# Patient Record
Sex: MALE | Race: WHITE | NOT HISPANIC OR LATINO | Employment: OTHER | URBAN - METROPOLITAN AREA
[De-identification: names, ages, dates, MRNs, and addresses within clinical notes are randomized per-mention and may not be internally consistent; named-entity substitution may affect disease eponyms.]

---

## 2020-02-18 ENCOUNTER — OFFICE VISIT CONVERTED (OUTPATIENT)
Dept: SURGERY | Facility: CLINIC | Age: 75
End: 2020-02-18
Attending: SURGERY

## 2020-10-27 ENCOUNTER — OFFICE VISIT CONVERTED (OUTPATIENT)
Dept: SURGERY | Facility: CLINIC | Age: 75
End: 2020-10-27
Attending: SURGERY

## 2020-12-14 ENCOUNTER — HOSPITAL ENCOUNTER (OUTPATIENT)
Dept: PREADMISSION TESTING | Facility: HOSPITAL | Age: 75
Discharge: HOME | End: 2020-12-14
Attending: SURGERY

## 2020-12-15 LAB — SARS-COV-2 RNA SPEC QL NAA+PROBE: NOT DETECTED

## 2020-12-18 ENCOUNTER — HOSPITAL ENCOUNTER (OUTPATIENT)
Dept: GASTROENTEROLOGY | Facility: HOSPITAL | Age: 75
Setting detail: HOSPITAL OUTPATIENT SURGERY
Discharge: HOME | End: 2020-12-18
Attending: SURGERY

## 2021-01-04 ENCOUNTER — OFFICE VISIT CONVERTED (OUTPATIENT)
Dept: SURGERY | Facility: CLINIC | Age: 76
End: 2021-01-04
Attending: SURGERY

## 2021-01-04 ENCOUNTER — CONVERSION ENCOUNTER (OUTPATIENT)
Dept: SURGERY | Facility: CLINIC | Age: 76
End: 2021-01-04

## 2021-05-14 VITALS — HEIGHT: 69 IN | BODY MASS INDEX: 25.92 KG/M2 | RESPIRATION RATE: 14 BRPM | WEIGHT: 175 LBS

## 2021-05-14 VITALS — BODY MASS INDEX: 25.98 KG/M2 | WEIGHT: 175.37 LBS | HEIGHT: 69 IN

## 2021-05-15 VITALS — RESPIRATION RATE: 16 BRPM | HEIGHT: 69 IN | WEIGHT: 182 LBS | BODY MASS INDEX: 26.96 KG/M2

## 2022-10-03 RX ORDER — CHLORAL HYDRATE 500 MG
CAPSULE ORAL
COMMUNITY

## 2022-10-03 RX ORDER — PRIMIDONE 50 MG/1
50 TABLET ORAL 3 TIMES DAILY
COMMUNITY

## 2022-10-03 RX ORDER — FLUOXETINE HYDROCHLORIDE 40 MG/1
40 CAPSULE ORAL DAILY
COMMUNITY
Start: 2022-07-14

## 2022-10-03 RX ORDER — FERROUS SULFATE 325(65) MG
TABLET ORAL
COMMUNITY

## 2022-10-03 RX ORDER — AMLODIPINE BESYLATE 2.5 MG/1
1 TABLET ORAL DAILY
COMMUNITY
Start: 2022-06-27

## 2022-10-03 RX ORDER — PAROXETINE HYDROCHLORIDE 20 MG/1
20 TABLET, FILM COATED ORAL DAILY
COMMUNITY

## 2022-10-03 RX ORDER — AMLODIPINE BESYLATE 5 MG/1
TABLET ORAL
COMMUNITY

## 2022-10-03 RX ORDER — ALPRAZOLAM 0.25 MG/1
1 TABLET ORAL
COMMUNITY
Start: 2022-05-19

## 2022-10-03 RX ORDER — PANTOPRAZOLE SODIUM 20 MG/1
TABLET, DELAYED RELEASE ORAL
COMMUNITY

## 2022-10-03 RX ORDER — DICLOFENAC SODIUM 75 MG/1
TABLET, DELAYED RELEASE ORAL
COMMUNITY

## 2022-10-03 RX ORDER — ASPIRIN 81 MG/1
TABLET ORAL
COMMUNITY

## 2022-10-03 RX ORDER — AMLODIPINE BESYLATE 2.5 MG/1
TABLET ORAL
COMMUNITY
Start: 2022-10-01

## 2022-10-03 RX ORDER — FLUTICASONE PROPIONATE 50 MCG
SPRAY, SUSPENSION (ML) NASAL
COMMUNITY

## 2022-10-03 RX ORDER — PRAVASTATIN SODIUM 40 MG
TABLET ORAL
COMMUNITY

## 2022-10-03 RX ORDER — SERTRALINE HYDROCHLORIDE 100 MG/1
TABLET, FILM COATED ORAL
COMMUNITY

## 2022-10-03 RX ORDER — MELATONIN
5000 DAILY
COMMUNITY

## 2022-10-03 RX ORDER — LISINOPRIL AND HYDROCHLOROTHIAZIDE 20; 12.5 MG/1; MG/1
1 TABLET ORAL 2 TIMES DAILY
COMMUNITY
Start: 2022-09-26

## 2022-10-03 RX ORDER — AZELASTINE HCL 205.5 UG/1
SPRAY NASAL
COMMUNITY

## 2022-10-03 RX ORDER — AMOXICILLIN 250 MG
CAPSULE ORAL DAILY
COMMUNITY

## 2022-10-03 RX ORDER — CETIRIZINE HYDROCHLORIDE 10 MG/1
10 TABLET ORAL DAILY
COMMUNITY

## 2022-10-03 RX ORDER — ESOMEPRAZOLE MAGNESIUM 40 MG/1
40 FOR SUSPENSION ORAL DAILY
COMMUNITY

## 2022-10-07 ENCOUNTER — OFFICE VISIT (OUTPATIENT)
Dept: SURGERY | Facility: CLINIC | Age: 77
End: 2022-10-07

## 2022-10-07 VITALS — WEIGHT: 174 LBS | RESPIRATION RATE: 16 BRPM | BODY MASS INDEX: 25.7 KG/M2 | OXYGEN SATURATION: 99 %

## 2022-10-07 DIAGNOSIS — R10.32 LEFT GROIN PAIN: Primary | ICD-10-CM

## 2022-10-07 PROCEDURE — 99212 OFFICE O/P EST SF 10 MIN: CPT | Performed by: SURGERY

## 2025-05-12 NOTE — PROGRESS NOTES
Chief Complaint/Reason for Referral:  Anemia    Lore, Heidy Laurent, A*  Heidy Torrez Anastasiia, APRN    Records Obtained:  Records of the patients history including those obtained from [facility] EMR were reviewed and summarized in detail.    Subjective    History of Present Illness    Josef Avila 79 y.o. presents to [facility] for Macrocytic Anemia    History of Present Illness  The patient is a 79-year-old male who presents to the hematology oncology department as a new patient for further evaluation of his anemia.    He was unaware of his anemic condition until recently. He reports no symptoms such as shortness of breath, palpitations, dizziness, or lightheadedness. He has not experienced any hematuria, epistaxis, or hemoptysis. He also reports no night sweats. He has not required a blood transfusion in the past and does not recall being advised to take iron supplements. He has been under the care of Dr. Moody for approximately one year and does not consult with any other specialists.    Blood work performed on 04/30/2025 revealed a hemoglobin level of 11.8, which is below the normal range for males. Additional blood work from 11/2024 showed a hemoglobin level of 12.2. He has observed minor blood spotting during defecation for the past few months, but it is not present in the stool itself. The blood is described as bright red. He has not sought medical attention for this issue. He reports no abdominal pain, changes in bowel habits, nausea, vomiting, diarrhea, or constipation. His appetite remains good, and he has not experienced any weight loss. He occasionally experiences fatigue.    SOCIAL HISTORY  He does not use tobacco or alcohol. He is  and lives with his wife.    Patient is originally from [place].     Cancer-related family history is not on file.     Oncology/Hematology History    No history exists.     Review of Systems    Current Outpatient Medications on  File Prior to Visit   Medication Sig Dispense Refill    ALPRAZolam (XANAX) 0.25 MG tablet Take 1 tablet by mouth every night at bedtime.      amLODIPine (NORVASC) 2.5 MG tablet       aspirin 81 MG EC tablet aspirin 81 mg oral tablet,delayed release (DR/EC) take 1 tablet (81 mg) by oral route once daily   Active      azelastine (ASTEPRO) 0.15 % solution nasal spray azelastine 0.15 % (205.5 mcg) nasal spray,non-aerosol spray 1 spray (205.5 mcg) in each nostril by intranasal route 2 times per day   Suspended      cetirizine (zyrTEC) 10 MG tablet Take 1 tablet by mouth Daily.      cholecalciferol (VITAMIN D3) 25 MCG (1000 UT) tablet Take 5 tablets by mouth Daily.      Cobalamin Combinations (B-12) 100-5000 MCG sublingual tablet Place  under the tongue Daily.      diclofenac (VOLTAREN) 75 MG EC tablet diclofenac sodium 75 mg oral tablet,delayed release (DR/EC) take 1 tablet (75 mg) by oral route 2 times per day   Active      esomeprazole (nexIUM) 20 MG capsule       esomeprazole (NexIUM) 40 MG packet Take 40 mg by mouth Daily.      ferrous sulfate 325 (65 FE) MG tablet       fluticasone (FLONASE) 50 MCG/ACT nasal spray fluticasone 50 mcg/actuation nasal spray,suspension spray 1 spray (50 mcg) in each nostril by intranasal route once daily   Active      lisinopril-hydrochlorothiazide (PRINZIDE,ZESTORETIC) 20-12.5 MG per tablet Take 1 tablet by mouth 2 (Two) Times a Day.      Omega-3 Fatty Acids (fish oil) 1000 MG capsule capsule Take  by mouth.      pantoprazole (PROTONIX) 20 MG EC tablet Protonix 20 mg oral tablet,delayed release (DR/EC) take 1 tablet (20 mg) by oral route once daily   Suspended      amLODIPine (NORVASC) 2.5 MG tablet Take 1 tablet by mouth Daily.      amLODIPine (NORVASC) 5 MG tablet amlodipine 5 mg oral tablet take 1 tablet (5 mg) by oral route once daily   Suspended      atorvastatin (LIPITOR) 40 MG tablet Take 1 tablet by mouth Daily.      FLUoxetine (PROzac) 40 MG capsule Take 1 capsule by mouth  "Daily.      PARoxetine (PAXIL) 20 MG tablet Take 1 tablet by mouth Daily.      pravastatin (PRAVACHOL) 40 MG tablet pravastatin 40 mg oral tablet take 1 tablet (40 mg) by oral route once daily at bedtime   Suspended      primidone (MYSOLINE) 50 MG tablet Take 1 tablet by mouth 3 (Three) Times a Day.      sertraline (ZOLOFT) 100 MG tablet        No current facility-administered medications on file prior to visit.     No Known Allergies  Past Medical History:   Diagnosis Date    Left lower quadrant abdominal pain      No past surgical history on file.  Social History     Socioeconomic History    Marital status:    Tobacco Use    Smoking status: Never    Smokeless tobacco: Never   Vaping Use    Vaping status: Never Used     No family history on file.  Immunization History   Administered Date(s) Administered    COVID-19 (PFIZER) Purple Cap Monovalent 02/23/2021, 03/16/2021, 11/18/2021       Tobacco Use: Low Risk  (5/13/2025)    Patient History     Smoking Tobacco Use: Never     Smokeless Tobacco Use: Never     Passive Exposure: Not on file   Recent Concern: Tobacco Use - Medium Risk (4/24/2025)    Received from [facility]    Patient History     Smoking Tobacco Use: Former     Smokeless Tobacco Use: Never     Passive Exposure: Not on file     Objective   Vitals:    05/13/25 1006   BP: 90/66   Pulse: 65   Resp: 18   Temp: 97.6 °F (36.4 °C)   TempSrc: Temporal   SpO2: 92%   Weight: 77.7 kg (171 lb 3.2 oz)   Height: 175.3 cm (69\")   PainSc: 0-No pain      Physical Exam  Vitals and nursing note reviewed.   Constitutional:       General: He is not in acute distress.     Appearance: Normal appearance. He is not ill-appearing.   HENT:      Head: Normocephalic.   Eyes:      Conjunctiva/sclera: Conjunctivae normal.   Cardiovascular:      Rate and Rhythm: Normal rate and regular rhythm.      Heart sounds: Normal heart sounds.   Pulmonary:      Effort: Pulmonary effort is normal.      Breath sounds: Normal breath " sounds.   Skin:     Coloration: Skin is not jaundiced.   Neurological:      Mental Status: He is alert.   Psychiatric:         Mood and Affect: Mood normal.         Behavior: Behavior normal. Behavior is cooperative.         Thought Content: Thought content normal.         Judgment: Judgment normal.       Wt Readings from Last 3 Encounters:   05/13/25 77.7 kg (171 lb 3.2 oz)   10/07/22 78.9 kg (174 lb)   01/04/21 79.5 kg (175 lb 6 oz)      Body mass index is 25.28 kg/m².    ECOG score: 0         ECOG: (1) Restricted in Physically Strenuous Activity, Ambulatory & Able to Do Work of Light Nature  Fall Risk Assessment was completed, and patient is at moderate risk for falls.  PHQ-9 Total Score:         The patient is  experiencing fatigue. Fatigue score: 3    PT/OT Functional Screening:   Speech Functional Screening:   Rehab to be ordered:     Vitals:    05/13/25 1006   PainSc: 0-No pain         Josef VITAL [place] reports a pain score of 0.       PHQ-2 Depression Screening  Little interest or pleasure in doing things? Not at all   Feeling down, depressed, or hopeless? Not at all   PHQ-2 Total Score 0     Result Review :  The following data was reviewed by: Herbert Foss PA-C on 05/13/2025:  RBC EVALUATION (MICROCYTOSIS/NORMOCYTOSIS):  Lab Results   Component Value Date    IRON 104 03/21/2024    FERRITIN 57 03/21/2024    LABIRON 23 10/12/2021    TIBC 388 03/21/2024     HEMOLYSIS EVALUATION:  Lab Results   Component Value Date     03/21/2024     MACROCYTOSIS EVALUATION:  Lab Results   Component Value Date    [id number] 788 03/21/2024    FOLATE >46.00 (H) 03/21/2024     RENAL FUNCTION TESTs:  Lab Results   Component Value Date    BUN 32 (H) 11/12/2024     LIVER FUNCTION TESTs:  Lab Results   Component Value Date    ALT 31 11/12/2024    AST 23 11/12/2024    BILITOT 0.63 11/12/2024    ALKPHOS 92 11/12/2024    ALBUMIN 4.2 11/12/2024     SERUM CHEMISTRIES:  Lab Results   Component Value Date     11/12/2024    K 4.5  11/12/2024     (H) 11/12/2024    CO2 24 11/12/2024    CALCIUM 9.2 11/12/2024     THYROID FUNCTION TESTs:  TSH   Date Value Ref Range Status   10/12/2021 0.94 0.42 - 5.47 uIU/mL Final     TUMOR MARKERS:  Lab Results   Component Value Date    PSA 1.36 11/12/2024     CT ABDOMEN PELVIS W CONTRAST (09/26/2022 14:38)   No Images in the past 120 days found..    Results  Labs   - White Blood Cell Count: 30 April 2025, 5.6   - Hemoglobin: 30 April 2025, 11.8   - MCV: 30 April 2025, 98   - Platelet Count: 30 April 2025, Normal   - Blood Glucose: 30 April 2025, 135   - BUN: 30 April 2025, Normal   - Creatinine: 30 April 2025, Normal   - Sodium: 30 April 2025, Normal   - Potassium: 30 April 2025, Normal   - Calcium: 30 April 2025, 9.7   - Bilirubin: 30 April 2025, Normal   - ALT: 30 April 2025, Normal   - AST: 30 April 2025, Normal   - Alkaline Phosphatase: 30 April 2025, Normal   - EGFR: 30 April 2025, 47   - Ferritin: 30 April 2025, 52   - Folic Acid: 30 April 2025, >20   - Iron: 30 April 2025, 126   - Reticulocyte Count: 30 April 2025, 1.58   - TIBC: 30 April 2025, 351   - Hemoglobin: November 2024, 12.2       Assessment and Plan:  Diagnoses and all orders for this visit:    1. Macrocytic anemia (Primary)  -     H. Pylori Antigen, Stool - Stool, Per Rectum  -     Vitamin B12 & Folate  -     Comprehensive Metabolic Panel  -     Urinalysis With Microscopic - Urine, Clean Catch  -     Occult Blood, Fecal By Immunoassay - Stool, Per Rectum  -     Reticulocytes  -     Lactate Dehydrogenase  -     Haptoglobin  -     TSH+Free T4  -     Iron Profile  -     Ferritin  -     CBC & Differential  -     Peripheral Blood Smear  -     Sedimentation Rate  -     C-reactive Protein  -     Free K+L Left Chains,Qn,Ur - Urine, Clean Catch  -     Immunofixation (AMY), Protein Electrophoresis (PE), and Quantitative Free Kappa and Lambda Light Chains (FLC), Serum  -     AMY & PE, Random Urine - Urine, Clean Catch    2. Stage 3b chronic kidney  disease  Comments:  Continue PCP/Nephrology  Orders:  -     Free K+L Left Chains,Qn,Ur - Urine, Clean Catch  -     Immunofixation (AMY), Protein Electrophoresis (PE), and Quantitative Free Kappa and Lambda Light Chains (FLC), Serum  -     AMY & PE, Random Urine - Urine, Clean Catch    3. Rectal bleeding  -     Ambulatory Referral to Gastroenterology      Assessment & Plan  Anemia  - Reports no symptoms such as shortness of breath, palpitations, dizziness, or lightheadedness  - Blood work from 30 April 2025 showed hemoglobin level of 11.8 g/dL (below normal range for males: 13-15 g/dL)  - MCV slightly high at 98 fL (indicating larger red blood cells)  - Reticulocyte count slightly elevated at 1.58% (suggesting active bone marrow response)  - Ferritin level at 52 ng/mL and iron level at 126 mcg/dL (both within normal ranges)  - Noticed small amount of bright red blood when wiping after bowel movements for the past couple of months (not reported to primary care provider)  - No significant weight loss, changes in bowel habits, or gastrointestinal symptoms (nausea, vomiting, diarrhea, or constipation)  - No tobacco or alcohol use  - Blood work will be ordered today to further evaluate anemia    Hematochezia  - Noticed small amount of bright red blood when wiping after bowel movements for the past couple of months (not reported to primary care provider or seen by a specialist)  - No significant weight loss, changes in bowel habits, or gastrointestinal symptoms (nausea, vomiting, diarrhea, or constipation)    -Encouraged patient to remain up to date on all recommended preventative medicine services specific for their sex and age.  Some examples include:  Diabetes screening, Hypertensive screening, Immunizations, Lipid screenings (cholesterol), Depression screenings, Colorectal cancer screening, HIV screening, Cervical cancer screening, Breast cancer screening, Osteoporosis screening, Alcohol screening, Dental screening,  Tobacco Use screening and Dietary screening    Patient Follow Up: 8 weeks with MD RIVERO spent 45 minutes caring for Josef on this date of service. This time includes time spent by me in the following activities:preparing for the visit, reviewing tests, obtaining and/or reviewing a separately obtained history, performing a medically appropriate examination and/or evaluation , counseling and educating the patient/family/caregiver, ordering medications, tests, or procedures, documenting information in the medical record, independently interpreting results and communicating that information with the patient/family/caregiver, and care coordination    Patient was given instructions and counseling regarding his condition or for health maintenance advice. Please see specific information pulled into the AVS if appropriate.     Patient or patient representative verbalized consent for the use of Ambient Listening during the visit with  Herbert Foss PA-C for chart documentation. 5/13/2025  10:16 EDT

## 2025-05-13 ENCOUNTER — CONSULT (OUTPATIENT)
Dept: ONCOLOGY | Facility: HOSPITAL | Age: 80
End: 2025-05-13
Payer: MEDICARE

## 2025-05-13 ENCOUNTER — LAB (OUTPATIENT)
Dept: ONCOLOGY | Facility: HOSPITAL | Age: 80
End: 2025-05-13
Payer: MEDICARE

## 2025-05-13 VITALS
BODY MASS INDEX: 25.36 KG/M2 | OXYGEN SATURATION: 92 % | TEMPERATURE: 97.6 F | WEIGHT: 171.2 LBS | DIASTOLIC BLOOD PRESSURE: 66 MMHG | SYSTOLIC BLOOD PRESSURE: 90 MMHG | HEIGHT: 69 IN | RESPIRATION RATE: 18 BRPM | HEART RATE: 65 BPM

## 2025-05-13 DIAGNOSIS — K62.5 RECTAL BLEEDING: ICD-10-CM

## 2025-05-13 DIAGNOSIS — N18.32 STAGE 3B CHRONIC KIDNEY DISEASE: ICD-10-CM

## 2025-05-13 DIAGNOSIS — D53.9 MACROCYTIC ANEMIA: Primary | ICD-10-CM

## 2025-05-13 DIAGNOSIS — D64.9 ANEMIA, UNSPECIFIED TYPE: Primary | ICD-10-CM

## 2025-05-13 LAB
ALBUMIN SERPL-MCNC: 4.5 G/DL (ref 3.5–5.2)
ALBUMIN/GLOB SERPL: 1.5 G/DL
ALP SERPL-CCNC: 100 U/L (ref 39–117)
ALT SERPL W P-5'-P-CCNC: 25 U/L (ref 1–41)
ANION GAP SERPL CALCULATED.3IONS-SCNC: 9.6 MMOL/L (ref 5–15)
ANISOCYTOSIS BLD QL: NORMAL
AST SERPL-CCNC: 20 U/L (ref 1–40)
BASOPHILS # BLD AUTO: 0.05 10*3/MM3 (ref 0–0.2)
BASOPHILS # BLD MANUAL: 0.06 10*3/MM3 (ref 0–0.2)
BASOPHILS NFR BLD AUTO: 0.9 % (ref 0–1.5)
BASOPHILS NFR BLD MANUAL: 1 % (ref 0–1.5)
BILIRUB SERPL-MCNC: 0.7 MG/DL (ref 0–1.2)
BUN SERPL-MCNC: 41 MG/DL (ref 8–23)
BUN/CREAT SERPL: 24.6 (ref 7–25)
CALCIUM SPEC-SCNC: 9.9 MG/DL (ref 8.6–10.5)
CHLORIDE SERPL-SCNC: 104 MMOL/L (ref 98–107)
CO2 SERPL-SCNC: 24.4 MMOL/L (ref 22–29)
CREAT SERPL-MCNC: 1.67 MG/DL (ref 0.76–1.27)
CRP SERPL-MCNC: <0.3 MG/DL (ref 0–0.5)
DEPRECATED RDW RBC AUTO: 49.9 FL (ref 37–54)
EGFRCR SERPLBLD CKD-EPI 2021: 41.6 ML/MIN/1.73
EOSINOPHIL # BLD AUTO: 0.33 10*3/MM3 (ref 0–0.4)
EOSINOPHIL # BLD MANUAL: 0.18 10*3/MM3 (ref 0–0.4)
EOSINOPHIL NFR BLD AUTO: 5.6 % (ref 0.3–6.2)
EOSINOPHIL NFR BLD MANUAL: 3 % (ref 0.3–6.2)
ERYTHROCYTE [DISTWIDTH] IN BLOOD BY AUTOMATED COUNT: 13.9 % (ref 12.3–15.4)
ERYTHROCYTE [SEDIMENTATION RATE] IN BLOOD: 7 MM/HR (ref 0–20)
FERRITIN SERPL-MCNC: 115.5 NG/ML (ref 30–400)
FOLATE SERPL-MCNC: 20 NG/ML (ref 4.78–24.2)
GLOBULIN UR ELPH-MCNC: 3 GM/DL
GLUCOSE SERPL-MCNC: 124 MG/DL (ref 65–99)
HAPTOGLOB SERPL-MCNC: 135 MG/DL (ref 30–200)
HCT VFR BLD AUTO: 35.2 % (ref 37.5–51)
HGB BLD-MCNC: 12 G/DL (ref 13–17.7)
IMM GRANULOCYTES # BLD AUTO: 0.02 10*3/MM3 (ref 0–0.05)
IMM GRANULOCYTES NFR BLD AUTO: 0.3 % (ref 0–0.5)
IRON 24H UR-MRATE: 118 MCG/DL (ref 59–158)
IRON SATN MFR SERPL: 29 % (ref 20–50)
LARGE PLATELETS: NORMAL
LDH SERPL-CCNC: 202 U/L (ref 135–225)
LYMPHOCYTES # BLD AUTO: 1.13 10*3/MM3 (ref 0.7–3.1)
LYMPHOCYTES # BLD MANUAL: 1.35 10*3/MM3 (ref 0.7–3.1)
LYMPHOCYTES NFR BLD AUTO: 19.3 % (ref 19.6–45.3)
LYMPHOCYTES NFR BLD MANUAL: 8 % (ref 5–12)
MACROCYTES BLD QL SMEAR: NORMAL
MCH RBC QN AUTO: 33.3 PG (ref 26.6–33)
MCHC RBC AUTO-ENTMCNC: 34.1 G/DL (ref 31.5–35.7)
MCV RBC AUTO: 97.8 FL (ref 79–97)
MONOCYTES # BLD AUTO: 0.5 10*3/MM3 (ref 0.1–0.9)
MONOCYTES # BLD: 0.47 10*3/MM3 (ref 0.1–0.9)
MONOCYTES NFR BLD AUTO: 8.5 % (ref 5–12)
NEUTROPHILS # BLD AUTO: 3.82 10*3/MM3 (ref 1.7–7)
NEUTROPHILS NFR BLD AUTO: 3.84 10*3/MM3 (ref 1.7–7)
NEUTROPHILS NFR BLD AUTO: 65.4 % (ref 42.7–76)
NEUTROPHILS NFR BLD MANUAL: 65 % (ref 42.7–76)
NRBC BLD AUTO-RTO: 0 /100 WBC (ref 0–0.2)
PATHOLOGY REVIEW: YES
PLATELET # BLD AUTO: 177 10*3/MM3 (ref 140–450)
PMV BLD AUTO: 9.3 FL (ref 6–12)
POTASSIUM SERPL-SCNC: 4.2 MMOL/L (ref 3.5–5.2)
PROT SERPL-MCNC: 7.5 G/DL (ref 6–8.5)
RBC # BLD AUTO: 3.6 10*6/MM3 (ref 4.14–5.8)
RETICS # AUTO: 0.06 10*6/MM3 (ref 0.02–0.13)
RETICS/RBC NFR AUTO: 1.79 % (ref 0.7–1.9)
SMALL PLATELETS BLD QL SMEAR: ADEQUATE
SODIUM SERPL-SCNC: 138 MMOL/L (ref 136–145)
T4 FREE SERPL-MCNC: 1.01 NG/DL (ref 0.92–1.68)
TIBC SERPL-MCNC: 404 MCG/DL (ref 298–536)
TRANSFERRIN SERPL-MCNC: 271 MG/DL (ref 200–360)
TSH SERPL DL<=0.05 MIU/L-ACNC: 0.93 UIU/ML (ref 0.27–4.2)
VARIANT LYMPHS NFR BLD MANUAL: 23 % (ref 19.6–45.3)
VIT B12 BLD-MCNC: 785 PG/ML (ref 211–946)
WBC MORPH BLD: NORMAL
WBC NRBC COR # BLD AUTO: 5.87 10*3/MM3 (ref 3.4–10.8)

## 2025-05-13 PROCEDURE — 82607 VITAMIN B-12: CPT | Performed by: PHYSICIAN ASSISTANT

## 2025-05-13 PROCEDURE — 82784 ASSAY IGA/IGD/IGG/IGM EACH: CPT | Performed by: PHYSICIAN ASSISTANT

## 2025-05-13 PROCEDURE — 85045 AUTOMATED RETICULOCYTE COUNT: CPT | Performed by: PHYSICIAN ASSISTANT

## 2025-05-13 PROCEDURE — 84443 ASSAY THYROID STIM HORMONE: CPT | Performed by: PHYSICIAN ASSISTANT

## 2025-05-13 PROCEDURE — 82728 ASSAY OF FERRITIN: CPT | Performed by: PHYSICIAN ASSISTANT

## 2025-05-13 PROCEDURE — 84466 ASSAY OF TRANSFERRIN: CPT | Performed by: PHYSICIAN ASSISTANT

## 2025-05-13 PROCEDURE — 84165 PROTEIN E-PHORESIS SERUM: CPT | Performed by: PHYSICIAN ASSISTANT

## 2025-05-13 PROCEDURE — 85025 COMPLETE CBC W/AUTO DIFF WBC: CPT | Performed by: PHYSICIAN ASSISTANT

## 2025-05-13 PROCEDURE — 83521 IG LIGHT CHAINS FREE EACH: CPT | Performed by: PHYSICIAN ASSISTANT

## 2025-05-13 PROCEDURE — G0463 HOSPITAL OUTPT CLINIC VISIT: HCPCS | Performed by: PHYSICIAN ASSISTANT

## 2025-05-13 PROCEDURE — 83540 ASSAY OF IRON: CPT | Performed by: PHYSICIAN ASSISTANT

## 2025-05-13 PROCEDURE — 80053 COMPREHEN METABOLIC PANEL: CPT | Performed by: PHYSICIAN ASSISTANT

## 2025-05-13 PROCEDURE — 84439 ASSAY OF FREE THYROXINE: CPT | Performed by: PHYSICIAN ASSISTANT

## 2025-05-13 PROCEDURE — 83615 LACTATE (LD) (LDH) ENZYME: CPT | Performed by: PHYSICIAN ASSISTANT

## 2025-05-13 PROCEDURE — 83010 ASSAY OF HAPTOGLOBIN QUANT: CPT | Performed by: PHYSICIAN ASSISTANT

## 2025-05-13 PROCEDURE — 86334 IMMUNOFIX E-PHORESIS SERUM: CPT | Performed by: PHYSICIAN ASSISTANT

## 2025-05-13 PROCEDURE — 82746 ASSAY OF FOLIC ACID SERUM: CPT | Performed by: PHYSICIAN ASSISTANT

## 2025-05-13 PROCEDURE — 85652 RBC SED RATE AUTOMATED: CPT | Performed by: PHYSICIAN ASSISTANT

## 2025-05-13 PROCEDURE — 36415 COLL VENOUS BLD VENIPUNCTURE: CPT | Performed by: PHYSICIAN ASSISTANT

## 2025-05-13 PROCEDURE — 86140 C-REACTIVE PROTEIN: CPT | Performed by: PHYSICIAN ASSISTANT

## 2025-05-13 RX ORDER — ATORVASTATIN CALCIUM 40 MG/1
40 TABLET, FILM COATED ORAL DAILY
COMMUNITY
Start: 2025-01-27

## 2025-05-14 ENCOUNTER — TELEPHONE (OUTPATIENT)
Dept: ONCOLOGY | Facility: HOSPITAL | Age: 80
End: 2025-05-14
Payer: MEDICARE

## 2025-05-14 LAB
CYTO UR: NORMAL
LAB AP CASE REPORT: NORMAL
LAB AP CLINICAL INFORMATION: NORMAL
PATH REPORT.FINAL DX SPEC: NORMAL
PATH REPORT.GROSS SPEC: NORMAL

## 2025-05-14 NOTE — TELEPHONE ENCOUNTER
Abigail called back. This nurse explained to to properly collect a stool sample. Abigail voiced understanding.

## 2025-05-14 NOTE — TELEPHONE ENCOUNTER
Left vm to return phone call about how to collect occult blood stool sample.    RN please advise if able. Thank you.

## 2025-05-15 LAB
ALBUMIN SERPL ELPH-MCNC: 3.6 G/DL (ref 2.9–4.4)
ALBUMIN/GLOB SERPL: 1.1 {RATIO} (ref 0.7–1.7)
ALPHA1 GLOB SERPL ELPH-MCNC: 0.2 G/DL (ref 0–0.4)
ALPHA2 GLOB SERPL ELPH-MCNC: 0.8 G/DL (ref 0.4–1)
B-GLOBULIN SERPL ELPH-MCNC: 1 G/DL (ref 0.7–1.3)
GAMMA GLOB SERPL ELPH-MCNC: 1.5 G/DL (ref 0.4–1.8)
GLOBULIN SER-MCNC: 3.5 G/DL (ref 2.2–3.9)
IGA SERPL-MCNC: 179 MG/DL (ref 61–437)
IGG SERPL-MCNC: 1600 MG/DL (ref 603–1613)
IGM SERPL-MCNC: 110 MG/DL (ref 15–143)
INTERPRETATION SERPL IEP-IMP: ABNORMAL
KAPPA LC FREE SER-MCNC: 38.2 MG/L (ref 3.3–19.4)
KAPPA LC FREE/LAMBDA FREE SER: 1.52 {RATIO} (ref 0.26–1.65)
LABORATORY COMMENT REPORT: ABNORMAL
LAMBDA LC FREE SERPL-MCNC: 25.2 MG/L (ref 5.7–26.3)
M PROTEIN SERPL ELPH-MCNC: ABNORMAL G/DL
PROT SERPL-MCNC: 7.1 G/DL (ref 6–8.5)

## 2025-05-20 ENCOUNTER — LAB (OUTPATIENT)
Facility: HOSPITAL | Age: 80
End: 2025-05-20
Payer: MEDICARE

## 2025-05-20 DIAGNOSIS — D89.89 KAPPA LIGHT CHAIN DISEASE: ICD-10-CM

## 2025-05-20 LAB
BACTERIA UR QL AUTO: NORMAL /HPF
BILIRUB UR QL STRIP: NEGATIVE
CLARITY UR: CLEAR
COLOR UR: YELLOW
GLUCOSE UR STRIP-MCNC: NEGATIVE MG/DL
H. PYLORI ANTIGEN STOOL: NEGATIVE
HGB UR QL STRIP.AUTO: NEGATIVE
HYALINE CASTS UR QL AUTO: NORMAL /LPF
KETONES UR QL STRIP: NEGATIVE
LEUKOCYTE ESTERASE UR QL STRIP.AUTO: NEGATIVE
NITRITE UR QL STRIP: NEGATIVE
PH UR STRIP.AUTO: 5.5 [PH] (ref 5–8)
PROT UR QL STRIP: NEGATIVE
RBC # UR STRIP: NORMAL /HPF
REF LAB TEST METHOD: NORMAL
SP GR UR STRIP: 1.02 (ref 1–1.03)
SQUAMOUS #/AREA URNS HPF: NORMAL /HPF
UROBILINOGEN UR QL STRIP: NORMAL
WBC # UR STRIP: NORMAL /HPF

## 2025-05-20 PROCEDURE — 87338 HPYLORI STOOL AG IA: CPT | Performed by: PHYSICIAN ASSISTANT

## 2025-05-20 PROCEDURE — 81001 URINALYSIS AUTO W/SCOPE: CPT | Performed by: PHYSICIAN ASSISTANT

## 2025-06-18 ENCOUNTER — OFFICE VISIT (OUTPATIENT)
Dept: SURGERY | Facility: CLINIC | Age: 80
End: 2025-06-18
Payer: MEDICARE

## 2025-06-18 ENCOUNTER — PREP FOR SURGERY (OUTPATIENT)
Dept: OTHER | Facility: HOSPITAL | Age: 80
End: 2025-06-18
Payer: MEDICARE

## 2025-06-18 VITALS
DIASTOLIC BLOOD PRESSURE: 65 MMHG | SYSTOLIC BLOOD PRESSURE: 108 MMHG | WEIGHT: 167.99 LBS | HEIGHT: 69 IN | OXYGEN SATURATION: 97 % | BODY MASS INDEX: 24.88 KG/M2 | HEART RATE: 61 BPM

## 2025-06-18 DIAGNOSIS — K62.5 BRBPR (BRIGHT RED BLOOD PER RECTUM): Primary | ICD-10-CM

## 2025-06-18 RX ORDER — SODIUM CHLORIDE 9 MG/ML
40 INJECTION, SOLUTION INTRAVENOUS AS NEEDED
OUTPATIENT
Start: 2025-06-18

## 2025-06-18 RX ORDER — POLYETHYLENE GLYCOL 3350 17 G/17G
POWDER, FOR SOLUTION ORAL
Qty: 238 PACKET | Refills: 0 | Status: SHIPPED | OUTPATIENT
Start: 2025-06-18

## 2025-06-18 RX ORDER — SODIUM CHLORIDE 0.9 % (FLUSH) 0.9 %
10 SYRINGE (ML) INJECTION AS NEEDED
OUTPATIENT
Start: 2025-06-18

## 2025-06-18 RX ORDER — SODIUM CHLORIDE 0.9 % (FLUSH) 0.9 %
3 SYRINGE (ML) INJECTION EVERY 12 HOURS SCHEDULED
OUTPATIENT
Start: 2025-06-18

## 2025-06-18 NOTE — PROGRESS NOTES
Chief Complaint: Colonoscopy    Subjective      Colonoscopy consultation       History of Present Illness  Josef Avila is a 79 y.o. male presents to [facility] for colonoscopy consultation.    Patient presents today with complaints of bright red blood per rectum while wiping.  Patient denies any abdominal pain or change in bowel habit.  Denies any family history of colorectal cancer.  Admits to a history of colonic polyps.    Denies NANCY.  Denies any cardiac issues.  Denies taking GLP-1 receptors.    12/20: egd & colonoscopy (Cristofer): small hiatal hernia; hemorrhoids.    12/16: egd & Colonoscopy (Cristofer): esophagitis; bile reflux; normal colon.     6/15: egd (Cristofer): Esophagitis; gastritis.      Objective     Past Medical History:   Diagnosis Date    Anemia     Colon polyp     Hypertension     Left lower quadrant abdominal pain        Past Surgical History:   Procedure Laterality Date    EYE SURGERY      Cataracts    FRACTURE SURGERY         Outpatient Medications Marked as Taking for the 6/18/25 encounter (Office Visit) with Rachel Gibbons, APRN   Medication Sig Dispense Refill    ALPRAZolam (XANAX) 0.25 MG tablet Take 1 tablet by mouth every night at bedtime.      amLODIPine (NORVASC) 2.5 MG tablet Take 1 tablet by mouth Daily.      amLODIPine (NORVASC) 5 MG tablet amlodipine 5 mg oral tablet take 1 tablet (5 mg) by oral route once daily   Suspended      aspirin 81 MG EC tablet aspirin 81 mg oral tablet,delayed release (DR/EC) take 1 tablet (81 mg) by oral route once daily   Active      atorvastatin (LIPITOR) 40 MG tablet Take 1 tablet by mouth Daily.      azelastine (ASTEPRO) 0.15 % solution nasal spray azelastine 0.15 % (205.5 mcg) nasal spray,non-aerosol spray 1 spray (205.5 mcg) in each nostril by intranasal route 2 times per day   Suspended      cetirizine (zyrTEC) 10 MG tablet Take 1 tablet by mouth Daily.      cholecalciferol (VITAMIN D3) 25 MCG (1000 UT) tablet Take 5  tablets by mouth Daily.      Cobalamin Combinations (B-12) 100-5000 MCG sublingual tablet Place  under the tongue Daily.      esomeprazole (nexIUM) 20 MG capsule       esomeprazole (NexIUM) 40 MG packet Take 40 mg by mouth Daily.      ferrous sulfate 325 (65 FE) MG tablet       FLUoxetine (PROzac) 40 MG capsule Take 1 capsule by mouth Daily.      fluticasone (FLONASE) 50 MCG/ACT nasal spray fluticasone 50 mcg/actuation nasal spray,suspension spray 1 spray (50 mcg) in each nostril by intranasal route once daily   Active      lisinopril-hydrochlorothiazide (PRINZIDE,ZESTORETIC) 20-12.5 MG per tablet Take 1 tablet by mouth 2 (Two) Times a Day.      Omega-3 Fatty Acids (fish oil) 1000 MG capsule capsule Take  by mouth.      pantoprazole (PROTONIX) 20 MG EC tablet Protonix 20 mg oral tablet,delayed release (DR/EC) take 1 tablet (20 mg) by oral route once daily   Suspended      PARoxetine (PAXIL) 20 MG tablet Take 1 tablet by mouth Daily.      pravastatin (PRAVACHOL) 40 MG tablet pravastatin 40 mg oral tablet take 1 tablet (40 mg) by oral route once daily at bedtime   Suspended      primidone (MYSOLINE) 50 MG tablet Take 1 tablet by mouth 3 (Three) Times a Day.      sertraline (ZOLOFT) 100 MG tablet          No Known Allergies     Family History   Problem Relation Age of Onset    Arthritis Mother     Diabetes Mother     Hearing loss Mother     Hypertension Mother     Alcohol abuse Father     Early death Father         Fredis Ornelas    Heart disease Father     Hypertension Father     Alcohol abuse Brother     Cancer Brother         Passed away    Diabetes Brother     Early death Brother         Pancreatitis cancer    Alcohol abuse Brother     Cancer Brother         Passed away    Depression Brother     Early death Brother         Lung cancer       Social History     Socioeconomic History    Marital status:    Tobacco Use    Smoking status: Former     Current packs/day: 1.00     Average packs/day: 1 pack/day for 15.0  "years (15.0 ttl pk-yrs)     Types: Cigarettes    Smokeless tobacco: Never   Vaping Use    Vaping status: Never Used   Substance and Sexual Activity    Alcohol use: Not Currently     Alcohol/week: 3.0 standard drinks of alcohol     Types: 3 Cans of beer per week    Drug use: Never    Sexual activity: Not Currently     Partners: Female     Birth control/protection: Abstinence, None       Review of Systems   Constitutional:  Negative for chills and fever.   Gastrointestinal:  Negative for abdominal distention, abdominal pain, anal bleeding, blood in stool, constipation, diarrhea and rectal pain.        Vital Signs:   /65 (BP Location: Right arm, Patient Position: Sitting, Cuff Size: Adult)   Pulse 61   Ht 175.3 cm (69\")   Wt 76.2 kg (167 lb 15.9 oz)   SpO2 97%   BMI 24.81 kg/m²      Physical Exam  Vitals and nursing note reviewed.   Constitutional:       General: He is not in acute distress.     Appearance: Normal appearance. He is not ill-appearing.   HENT:      Head: Normocephalic and atraumatic.   Cardiovascular:      Rate and Rhythm: Normal rate.   Pulmonary:      Effort: Pulmonary effort is normal.      Breath sounds: No stridor.   Abdominal:      Palpations: Abdomen is soft.      Tenderness: There is no guarding.   Musculoskeletal:         General: No deformity. Normal range of motion.   Skin:     General: Skin is warm and dry.      Coloration: Skin is not jaundiced.   Neurological:      General: No focal deficit present.      Mental Status: He is alert and oriented to person, place, and time.   Psychiatric:         Mood and Affect: Mood normal.         Thought Content: Thought content normal.          Result Review :          []  Laboratory  []  Radiology  []  Pathology  []  Microbiology  []  EKG/Telemetry   []  Cardiology/Vascular   []  Old records  I spent 30 minutes caring for Josef on this date of service. This time includes time spent by me in the following activities: reviewing tests, obtaining " and/or reviewing a separately obtained history, performing a medically appropriate examination and/or evaluation, ordering medications, tests, or procedures, and documenting information in the medical record        Assessment and Plan    Diagnoses and all orders for this visit:    1. BRBPR (bright red blood per rectum) (Primary)    Other orders  -     polyethylene glycol (MIRALAX) 17 g packet; Take as directed.  Instructions given in office.  Dispense: 238 g bottle  Dispense: 238 packet; Refill: 0        Follow Up   Return for Schedule colonoscopy with Dr. Etienne on 6/30/2025 [facility].    Hospital arrival time: 0600.    Possible risks/complications, benefits, and alternatives to surgical or invasive procedures have been explained to patient and/or legal guardian.    Patient has been evaluated and can tolerate anesthesia and/or sedation. Risks, benefits, and alternatives to anesthesia and sedation have been explained to the patient and/or legal guardian. Patient verbalizes understanding and is willing to proceed with the above plan.     Patient was given instructions and counseling regarding his condition or for health maintenance advice. Please see specific information pulled into the AVS if appropriate.     As always, it has been a pleasure to participate in your patient's care. Please call with questions or concerns.

## 2025-06-18 NOTE — H&P (VIEW-ONLY)
Chief Complaint: Colonoscopy    Subjective      Colonoscopy consultation       History of Present Illness  Josef Avila is a 79 y.o. male presents to [facility] for colonoscopy consultation.    Patient presents today with complaints of bright red blood per rectum while wiping.  Patient denies any abdominal pain or change in bowel habit.  Denies any family history of colorectal cancer.  Admits to a history of colonic polyps.    Denies NANCY.  Denies any cardiac issues.  Denies taking GLP-1 receptors.    12/20: egd & colonoscopy ([facility]): small hiatal hernia; hemorrhoids.    12/16: egd & Colonoscopy ([facility]): esophagitis; bile reflux; normal colon.     6/15: egd ([facility]): Esophagitis; gastritis.      Objective     Past Medical History:   Diagnosis Date    Anemia     Colon polyp     Hypertension     Left lower quadrant abdominal pain        Past Surgical History:   Procedure Laterality Date    EYE SURGERY      Cataracts    FRACTURE SURGERY         Outpatient Medications Marked as Taking for the 6/18/25 encounter (Office Visit) with Rachel Gibbons, APRN   Medication Sig Dispense Refill    ALPRAZolam (XANAX) 0.25 MG tablet Take 1 tablet by mouth every night at bedtime.      amLODIPine (NORVASC) 2.5 MG tablet Take 1 tablet by mouth Daily.      amLODIPine (NORVASC) 5 MG tablet amlodipine 5 mg oral tablet take 1 tablet (5 mg) by oral route once daily   Suspended      aspirin 81 MG EC tablet aspirin 81 mg oral tablet,delayed release (DR/EC) take 1 tablet (81 mg) by oral route once daily   Active      atorvastatin (LIPITOR) 40 MG tablet Take 1 tablet by mouth Daily.      azelastine (ASTEPRO) 0.15 % solution nasal spray azelastine 0.15 % (205.5 mcg) nasal spray,non-aerosol spray 1 spray (205.5 mcg) in each nostril by intranasal route 2 times per day   Suspended      cetirizine (zyrTEC) 10 MG tablet Take 1 tablet by mouth Daily.      cholecalciferol (VITAMIN D3) 25 MCG (1000 UT) tablet Take 5  tablets by mouth Daily.      Cobalamin Combinations (B-12) 100-5000 MCG sublingual tablet Place  under the tongue Daily.      esomeprazole (nexIUM) 20 MG capsule       esomeprazole (NexIUM) 40 MG packet Take 40 mg by mouth Daily.      ferrous sulfate 325 (65 FE) MG tablet       FLUoxetine (PROzac) 40 MG capsule Take 1 capsule by mouth Daily.      fluticasone (FLONASE) 50 MCG/ACT nasal spray fluticasone 50 mcg/actuation nasal spray,suspension spray 1 spray (50 mcg) in each nostril by intranasal route once daily   Active      lisinopril-hydrochlorothiazide (PRINZIDE,ZESTORETIC) 20-12.5 MG per tablet Take 1 tablet by mouth 2 (Two) Times a Day.      Omega-3 Fatty Acids (fish oil) 1000 MG capsule capsule Take  by mouth.      pantoprazole (PROTONIX) 20 MG EC tablet Protonix 20 mg oral tablet,delayed release (DR/EC) take 1 tablet (20 mg) by oral route once daily   Suspended      PARoxetine (PAXIL) 20 MG tablet Take 1 tablet by mouth Daily.      pravastatin (PRAVACHOL) 40 MG tablet pravastatin 40 mg oral tablet take 1 tablet (40 mg) by oral route once daily at bedtime   Suspended      primidone (MYSOLINE) 50 MG tablet Take 1 tablet by mouth 3 (Three) Times a Day.      sertraline (ZOLOFT) 100 MG tablet          No Known Allergies     Family History   Problem Relation Age of Onset    Arthritis Mother     Diabetes Mother     Hearing loss Mother     Hypertension Mother     Alcohol abuse Father     Early death Father         Fredis Ornelas    Heart disease Father     Hypertension Father     Alcohol abuse Brother     Cancer Brother         Passed away    Diabetes Brother     Early death Brother         Pancreatitis cancer    Alcohol abuse Brother     Cancer Brother         Passed away    Depression Brother     Early death Brother         Lung cancer       Social History     Socioeconomic History    Marital status:    Tobacco Use    Smoking status: Former     Current packs/day: 1.00     Average packs/day: 1 pack/day for 15.0  "years (15.0 ttl pk-yrs)     Types: Cigarettes    Smokeless tobacco: Never   Vaping Use    Vaping status: Never Used   Substance and Sexual Activity    Alcohol use: Not Currently     Alcohol/week: 3.0 standard drinks of alcohol     Types: 3 Cans of beer per week    Drug use: Never    Sexual activity: Not Currently     Partners: Female     Birth control/protection: Abstinence, None       Review of Systems   Constitutional:  Negative for chills and fever.   Gastrointestinal:  Negative for abdominal distention, abdominal pain, anal bleeding, blood in stool, constipation, diarrhea and rectal pain.        Vital Signs:   /65 (BP Location: Right arm, Patient Position: Sitting, Cuff Size: Adult)   Pulse 61   Ht 175.3 cm (69\")   Wt 76.2 kg (167 lb 15.9 oz)   SpO2 97%   BMI 24.81 kg/m²      Physical Exam  Vitals and nursing note reviewed.   Constitutional:       General: He is not in acute distress.     Appearance: Normal appearance. He is not ill-appearing.   HENT:      Head: Normocephalic and atraumatic.   Cardiovascular:      Rate and Rhythm: Normal rate.   Pulmonary:      Effort: Pulmonary effort is normal.      Breath sounds: No stridor.   Abdominal:      Palpations: Abdomen is soft.      Tenderness: There is no guarding.   Musculoskeletal:         General: No deformity. Normal range of motion.   Skin:     General: Skin is warm and dry.      Coloration: Skin is not jaundiced.   Neurological:      General: No focal deficit present.      Mental Status: He is alert and oriented to person, place, and time.   Psychiatric:         Mood and Affect: Mood normal.         Thought Content: Thought content normal.          Result Review :          []  Laboratory  []  Radiology  []  Pathology  []  Microbiology  []  EKG/Telemetry   []  Cardiology/Vascular   []  Old records  I spent 30 minutes caring for Josef on this date of service. This time includes time spent by me in the following activities: reviewing tests, obtaining " and/or reviewing a separately obtained history, performing a medically appropriate examination and/or evaluation, ordering medications, tests, or procedures, and documenting information in the medical record        Assessment and Plan    Diagnoses and all orders for this visit:    1. BRBPR (bright red blood per rectum) (Primary)    Other orders  -     polyethylene glycol (MIRALAX) 17 g packet; Take as directed.  Instructions given in office.  Dispense: 238 g bottle  Dispense: 238 packet; Refill: 0        Follow Up   Return for Schedule colonoscopy with Dr. Etienne on 6/30/2025 [facility] [place].    Hospital arrival time: 0600.    Possible risks/complications, benefits, and alternatives to surgical or invasive procedures have been explained to patient and/or legal guardian.    Patient has been evaluated and can tolerate anesthesia and/or sedation. Risks, benefits, and alternatives to anesthesia and sedation have been explained to the patient and/or legal guardian. Patient verbalizes understanding and is willing to proceed with the above plan.     Patient was given instructions and counseling regarding his condition or for health maintenance advice. Please see specific information pulled into the AVS if appropriate.     As always, it has been a pleasure to participate in your patient's care. Please call with questions or concerns.

## 2025-06-27 ENCOUNTER — ANESTHESIA EVENT (OUTPATIENT)
Dept: GASTROENTEROLOGY | Facility: HOSPITAL | Age: 80
End: 2025-06-27
Payer: MEDICARE

## 2025-06-27 NOTE — ANESTHESIA PREPROCEDURE EVALUATION
Anesthesia Evaluation     Patient summary reviewed and Nursing notes reviewed   NPO Solid Status: > 8 hours  NPO Liquid Status: > 8 hours           Airway   Mallampati: II  TM distance: >3 FB  Neck ROM: full  Possible difficult intubation  Dental          Pulmonary - normal exam   (+) a smoker,  Cardiovascular - normal exam    (+) hypertension 2 medications or greater, hyperlipidemia    ROS comment: EKG greater than 3 years, denies soa or CP    Neuro/Psych  (+) Parkinson's disease, psychiatric history Anxiety and Depression    ROS Comment: Pt has hand trimmers, mild symptoms  GI/Hepatic/Renal/Endo    (+) GERD, GI bleeding     Musculoskeletal (-) negative ROS    Abdominal  - normal exam   Substance History      OB/GYN          Other        ROS/Med Hx Other: ASA 6/29                Anesthesia Plan    ASA 3     general   total IV anesthesia  (Patient understands anesthesia not responsible for dental damage. Risks explained including allergic reactions, BP, HR, O2 changes, aspiration, advanced airway placement. Pt verbalized understanding.)  intravenous induction     Anesthetic plan, risks, benefits, and alternatives have been provided, discussed and informed consent has been obtained with: patient and spouse/significant other.    Plan discussed with CRNA.      CODE STATUS:

## 2025-06-30 ENCOUNTER — ANESTHESIA (OUTPATIENT)
Dept: GASTROENTEROLOGY | Facility: HOSPITAL | Age: 80
End: 2025-06-30
Payer: MEDICARE

## 2025-06-30 ENCOUNTER — HOSPITAL ENCOUNTER (OUTPATIENT)
Facility: HOSPITAL | Age: 80
Setting detail: HOSPITAL OUTPATIENT SURGERY
Discharge: HOME | End: 2025-06-30
Attending: SURGERY | Admitting: SURGERY
Payer: MEDICARE

## 2025-06-30 VITALS
BODY MASS INDEX: 24.95 KG/M2 | DIASTOLIC BLOOD PRESSURE: 64 MMHG | HEART RATE: 63 BPM | HEIGHT: 69 IN | RESPIRATION RATE: 21 BRPM | OXYGEN SATURATION: 99 % | SYSTOLIC BLOOD PRESSURE: 103 MMHG | TEMPERATURE: 98 F | WEIGHT: 168.43 LBS

## 2025-06-30 DIAGNOSIS — K62.5 BRBPR (BRIGHT RED BLOOD PER RECTUM): ICD-10-CM

## 2025-06-30 PROCEDURE — 25010000002 LIDOCAINE PF 2% 2 % SOLUTION: Performed by: NURSE ANESTHETIST, CERTIFIED REGISTERED

## 2025-06-30 PROCEDURE — 25810000003 LACTATED RINGERS PER 1000 ML

## 2025-06-30 PROCEDURE — 25010000002 PROPOFOL 10 MG/ML EMULSION: Performed by: NURSE ANESTHETIST, CERTIFIED REGISTERED

## 2025-06-30 RX ORDER — ONDANSETRON 4 MG/1
4 TABLET, ORALLY DISINTEGRATING ORAL ONCE AS NEEDED
Status: DISCONTINUED | OUTPATIENT
Start: 2025-06-30 | End: 2025-06-30 | Stop reason: HOSPADM

## 2025-06-30 RX ORDER — ONDANSETRON 2 MG/ML
4 INJECTION INTRAMUSCULAR; INTRAVENOUS ONCE AS NEEDED
Status: DISCONTINUED | OUTPATIENT
Start: 2025-06-30 | End: 2025-06-30 | Stop reason: HOSPADM

## 2025-06-30 RX ORDER — LIDOCAINE HYDROCHLORIDE 20 MG/ML
INJECTION, SOLUTION EPIDURAL; INFILTRATION; INTRACAUDAL; PERINEURAL AS NEEDED
Status: DISCONTINUED | OUTPATIENT
Start: 2025-06-30 | End: 2025-06-30 | Stop reason: SURG

## 2025-06-30 RX ORDER — EPHEDRINE SULFATE 50 MG/ML
INJECTION INTRAVENOUS AS NEEDED
Status: DISCONTINUED | OUTPATIENT
Start: 2025-06-30 | End: 2025-06-30 | Stop reason: SURG

## 2025-06-30 RX ORDER — PROPOFOL 10 MG/ML
VIAL (ML) INTRAVENOUS AS NEEDED
Status: DISCONTINUED | OUTPATIENT
Start: 2025-06-30 | End: 2025-06-30 | Stop reason: SURG

## 2025-06-30 RX ORDER — SODIUM CHLORIDE 0.9 % (FLUSH) 0.9 %
3 SYRINGE (ML) INJECTION EVERY 12 HOURS SCHEDULED
Status: DISCONTINUED | OUTPATIENT
Start: 2025-06-30 | End: 2025-06-30 | Stop reason: HOSPADM

## 2025-06-30 RX ORDER — SODIUM CHLORIDE 0.9 % (FLUSH) 0.9 %
10 SYRINGE (ML) INJECTION AS NEEDED
Status: DISCONTINUED | OUTPATIENT
Start: 2025-06-30 | End: 2025-06-30 | Stop reason: HOSPADM

## 2025-06-30 RX ORDER — SODIUM CHLORIDE 9 MG/ML
40 INJECTION, SOLUTION INTRAVENOUS AS NEEDED
Status: DISCONTINUED | OUTPATIENT
Start: 2025-06-30 | End: 2025-06-30 | Stop reason: HOSPADM

## 2025-06-30 RX ORDER — SODIUM CHLORIDE, SODIUM LACTATE, POTASSIUM CHLORIDE, CALCIUM CHLORIDE 600; 310; 30; 20 MG/100ML; MG/100ML; MG/100ML; MG/100ML
30 INJECTION, SOLUTION INTRAVENOUS CONTINUOUS
Status: DISCONTINUED | OUTPATIENT
Start: 2025-06-30 | End: 2025-06-30 | Stop reason: HOSPADM

## 2025-06-30 RX ADMIN — LIDOCAINE HYDROCHLORIDE 60 MG: 20 INJECTION, SOLUTION EPIDURAL; INFILTRATION; INTRACAUDAL; PERINEURAL at 07:43

## 2025-06-30 RX ADMIN — SODIUM CHLORIDE, POTASSIUM CHLORIDE, SODIUM LACTATE AND CALCIUM CHLORIDE: 600; 310; 30; 20 INJECTION, SOLUTION INTRAVENOUS at 07:23

## 2025-06-30 RX ADMIN — PROPOFOL 200 MCG/KG/MIN: 10 INJECTION, EMULSION INTRAVENOUS at 07:43

## 2025-06-30 RX ADMIN — PROPOFOL 80 MG: 10 INJECTION, EMULSION INTRAVENOUS at 07:43

## 2025-06-30 RX ADMIN — EPHEDRINE SULFATE 10 MG: 50 INJECTION INTRAVENOUS at 07:48

## 2025-06-30 NOTE — ANESTHESIA POSTPROCEDURE EVALUATION
Patient: Josef Avila    Procedure Summary       Date: 06/30/25 Room / Location: [facility] ENDOSCOPY 3 / [facility] ENDOSCOPY    Anesthesia Start: 0741 Anesthesia Stop: 0810    Procedure: COLONOSCOPY Diagnosis:       BRBPR (bright red blood per rectum)      (BRBPR (bright red blood per rectum) [K62.5])    Surgeons: Calvin Etienne MD Provider: Doug Dailey CRNA    Anesthesia Type: general ASA Status: 3            Anesthesia Type: general    Vitals  Vitals Value Taken Time   /64 06/30/25 08:27   Temp 36.7 °C (98 °F) 06/30/25 08:27   Pulse 63 06/30/25 08:27   Resp 21 06/30/25 08:27   SpO2 99 % 06/30/25 08:27           Post Anesthesia Care and Evaluation    Post-procedure mental status: acceptable.  Pain management: satisfactory to patient    Airway patency: patent  Anesthetic complications: No anesthetic complications    Cardiovascular status: acceptable  Respiratory status: acceptable    Comments: Per chart review

## 2025-07-07 ENCOUNTER — TELEPHONE (OUTPATIENT)
Dept: SURGERY | Facility: CLINIC | Age: 80
End: 2025-07-07
Payer: MEDICARE

## 2025-07-07 NOTE — PROGRESS NOTES
Chief Complaint/Reason for Referral:  Anemia    Heidy Torrez, A*  Heidy Torrez, APRN    Records Obtained:  Records of the patients history including those obtained from [facility] EMR were reviewed and summarized in detail.    Subjective    History of Present Illness    Josef Avila 80 y.o. presents to [facility] HEMATOLOGY [facility] ONCOLOGY for Macrocytic Anemia    History of Present Illness  The patient is a 79-year-old male who presents to the hematology oncology department as a new patient for further evaluation of his anemia.    He was unaware of his anemic condition until recently. He reports no symptoms such as shortness of breath, palpitations, dizziness, or lightheadedness. He has not experienced any hematuria, epistaxis, or hemoptysis. He also reports no night sweats. He has not required a blood transfusion in the past and does not recall being advised to take iron supplements. He has been under the care of Dr. Moody for approximately one year and does not consult with any other specialists.    Blood work performed on 04/30/2025 revealed a hemoglobin level of 11.8, which is below the normal range for males. Additional blood work from 11/2024 showed a hemoglobin level of 12.2. He has observed minor blood spotting during defecation for the past few months, but it is not present in the stool itself. The blood is described as bright red. He has not sought medical attention for this issue. He reports no abdominal pain, changes in bowel habits, nausea, vomiting, diarrhea, or constipation. His appetite remains good, and he has not experienced any weight loss. He occasionally experiences fatigue.    SOCIAL HISTORY  He does not use tobacco or alcohol. He is  and lives with his wife.    Patient is originally from [place].     7/8/25:  History of Present Illness  The patient is a 80-year-old male who presents to the hematology oncology department for follow-up on his anemia. He is  status post colonoscopy with Dr. Etienne.    A colonoscopy performed by Dr. Etienne revealed the presence of both internal and external hemorrhoids, which are suspected to be the source of his bleeding. A consultation with Dr. Etienne is planned to discuss potential treatment options, including banding.    Significant bleeding has been reported, although he has not disclosed this to his healthcare providers. He has a low fluid intake, preferring Coke over water, and consumes coffee excessively. Additionally, he consumes ice cream when thirsty and spends a considerable amount of time in bed. His kidney function has been noted to be low, with an EGFR of 41.6, and he has been advised to increase his fluid intake to improve kidney function.    He has been diagnosed with Parkinson's disease.    SOCIAL HISTORY  He is originally from [place].    Cancer-related family history includes Cancer in his brother and brother.     Oncology/Hematology History    No history exists.     Review of Systems    Current Outpatient Medications on File Prior to Visit   Medication Sig Dispense Refill    ALPRAZolam (XANAX) 0.25 MG tablet Take 1 tablet by mouth every night at bedtime.      amLODIPine (NORVASC) 5 MG tablet amlodipine 5 mg oral tablet take 1 tablet (5 mg) by oral route once daily   Suspended      aspirin 81 MG EC tablet aspirin 81 mg oral tablet,delayed release (DR/EC) take 1 tablet (81 mg) by oral route once daily   Active      atorvastatin (LIPITOR) 40 MG tablet Take 1 tablet by mouth Daily.      azelastine (ASTEPRO) 0.15 % solution nasal spray azelastine 0.15 % (205.5 mcg) nasal spray,non-aerosol spray 1 spray (205.5 mcg) in each nostril by intranasal route 2 times per day   Suspended      carbidopa-levodopa (SINEMET)  MG per tablet Take 1 tablet by mouth 3 (Three) Times a Day.      cholecalciferol (VITAMIN D3) 25 MCG (1000 UT) tablet Take 5 tablets by mouth Daily.      Cobalamin Combinations (B-12) 100-5000 MCG  sublingual tablet Place  under the tongue Daily.      esomeprazole (NexIUM) 40 MG packet Take 40 mg by mouth Daily.      FLUoxetine (PROzac) 40 MG capsule Take 1 capsule by mouth Daily.      fluticasone (FLONASE) 50 MCG/ACT nasal spray fluticasone 50 mcg/actuation nasal spray,suspension spray 1 spray (50 mcg) in each nostril by intranasal route once daily   Active      lisinopril-hydrochlorothiazide (PRINZIDE,ZESTORETIC) 20-12.5 MG per tablet Take 1 tablet by mouth 2 (Two) Times a Day.      PARoxetine (PAXIL) 20 MG tablet Take 1 tablet by mouth Daily.      primidone (MYSOLINE) 50 MG tablet Take 1 tablet by mouth 3 (Three) Times a Day.      cetirizine (zyrTEC) 10 MG tablet Take 1 tablet by mouth Daily. (Patient not taking: Reported on 7/8/2025)      ferrous sulfate 325 (65 FE) MG tablet  (Patient not taking: Reported on 7/8/2025)      sertraline (ZOLOFT) 100 MG tablet        No current facility-administered medications on file prior to visit.     No Known Allergies  Past Medical History:   Diagnosis Date    Anemia     Colon polyp     GERD (gastroesophageal reflux disease)     Hyperlipidemia     Hypertension     Left lower quadrant abdominal pain     Parkinson's disease      Past Surgical History:   Procedure Laterality Date    COLONOSCOPY      COLONOSCOPY N/A 6/30/2025    Procedure: COLONOSCOPY;  Surgeon: Calvin Etienne MD;  Location: [facility] ENDOSCOPY;  Service: General;  Laterality: N/A;  HEMORRHOIDS    EYE SURGERY      Cataracts    FRACTURE SURGERY       Social History     Socioeconomic History    Marital status:    Tobacco Use    Smoking status: Former     Current packs/day: 0.00     Average packs/day: 1 pack/day for 45.0 years (45.0 ttl pk-yrs)     Types: Cigarettes     Start date: 1965     Quit date: 2010     Years since quitting: 15.5    Smokeless tobacco: Never   Vaping Use    Vaping status: Never Used   Substance and Sexual Activity    Alcohol use: Not Currently     Alcohol/week: 3.0 standard drinks  "of alcohol     Types: 3 Cans of beer per week    Drug use: Never    Sexual activity: Not Currently     Partners: Female     Birth control/protection: Abstinence, None     Family History   Problem Relation Age of Onset    Arthritis Mother     Diabetes Mother     Hearing loss Mother     Hypertension Mother     Alcohol abuse Father     Early death Father         Fredis Ornelas    Heart disease Father     Hypertension Father     Alcohol abuse Brother     Cancer Brother         Passed away    Diabetes Brother     Early death Brother         Pancreatitis cancer    Alcohol abuse Brother     Cancer Brother         Passed away    Depression Brother     Early death Brother         Lung cancer     Immunization History   Administered Date(s) Administered    COVID-19 (PFIZER) Purple Cap Monovalent 02/23/2021, 03/16/2021, 11/18/2021    Fluzone High-Dose 65+YRS 09/20/2024    Fluzone High-Dose 65+yrs 10/12/2021, 09/21/2023, 11/07/2023     Tobacco Use: Medium Risk (7/8/2025)    Patient History     Smoking Tobacco Use: Former     Smokeless Tobacco Use: Never     Passive Exposure: Not on file     Objective   Vitals:    07/08/25 0956   BP: 101/70   Pulse: 65   Resp: 16   TempSrc: Oral   SpO2: 93%   Weight: 76.6 kg (168 lb 12.8 oz)   Height: 175.3 cm (69\")   PainSc: 0-No pain     Physical Exam  Vitals and nursing note reviewed.   Constitutional:       General: He is not in acute distress.     Appearance: Normal appearance. He is not ill-appearing.   HENT:      Head: Normocephalic.   Eyes:      Conjunctiva/sclera: Conjunctivae normal.   Cardiovascular:      Rate and Rhythm: Normal rate and regular rhythm.      Heart sounds: Normal heart sounds.   Pulmonary:      Effort: Pulmonary effort is normal.      Breath sounds: Normal breath sounds.   Skin:     Coloration: Skin is not jaundiced.   Neurological:      Mental Status: He is alert.   Psychiatric:         Mood and Affect: Mood normal.         Behavior: Behavior normal. Behavior is " cooperative.         Thought Content: Thought content normal.         Judgment: Judgment normal.       Wt Readings from Last 3 Encounters:   07/08/25 76.6 kg (168 lb 12.8 oz)   06/30/25 76.4 kg (168 lb 6.9 oz)   06/18/25 76.2 kg (167 lb 15.9 oz)      Body mass index is 24.93 kg/m².    ECOG score: 2         ECOG: (1) Restricted in Physically Strenuous Activity, Ambulatory & Able to Do Work of Light Nature  Fall Risk Assessment was completed, and patient is at moderate risk for falls.  PHQ-9 Total Score:         The patient is  experiencing fatigue. Fatigue score: 3    PT/OT Functional Screening:   Speech Functional Screening:   Rehab to be ordered:     Vitals:    07/08/25 0956   PainSc: 0-No pain           Josef ZAHRAA Avila reports a pain score of 0.       PHQ-2 Depression Screening  Little interest or pleasure in doing things? Not at all   Feeling down, depressed, or hopeless? Several days   PHQ-2 Total Score 1     Result Review :  The following data was reviewed by: Herbert Foss PA-C on 05/13/2025:  RBC EVALUATION (MICROCYTOSIS/NORMOCYTOSIS):  Lab Results   Component Value Date    RETIC 0.0644 05/13/2025    MCV 97.8 (H) 05/13/2025    IRON 118 05/13/2025    FERRITIN 115.50 05/13/2025    LABIRON 29 05/13/2025    TIBC 404 05/13/2025    TRANSFERRIN 271 05/13/2025     HEMOLYSIS EVALUATION:  Lab Results   Component Value Date    MCV 97.8 (H) 05/13/2025     05/13/2025    HAPTOGLOBIN 135 05/13/2025    RETIC 0.0644 05/13/2025     MACROCYTOSIS EVALUATION:  Lab Results   Component Value Date    RETIC 0.0644 05/13/2025    MCV 97.8 (H) 05/13/2025    XGHHGKLZ97 785 05/13/2025    FOLATE 20.00 05/13/2025     RENAL FUNCTION TESTs:  Lab Results   Component Value Date    EGFR 41.6 (L) 05/13/2025    BUN 41 (H) 05/13/2025     LIVER FUNCTION TESTs:  Lab Results   Component Value Date    ALT 25 05/13/2025    AST 20 05/13/2025    BILITOT 0.7 05/13/2025    ALKPHOS 100 05/13/2025    PROTEINTOT 7.5 05/13/2025    PROTEINTOT 7.1  05/13/2025    ALBUMIN 4.5 05/13/2025    ALBUMIN 3.6 05/13/2025     SERUM CHEMISTRIES:  Lab Results   Component Value Date     05/13/2025    K 4.2 05/13/2025     05/13/2025    CO2 24.4 05/13/2025    CALCIUM 9.9 05/13/2025     THYROID FUNCTION TESTs:  TSH   Date Value Ref Range Status   05/13/2025 0.931 0.270 - 4.200 uIU/mL Final     Free T4   Date Value Ref Range Status   05/13/2025 1.01 0.92 - 1.68 ng/dL Final     TUMOR MARKERS:  Lab Results   Component Value Date    PSA 1.36 11/12/2024     CT ABDOMEN PELVIS W CONTRAST (09/26/2022 14:38)   No Images in the past 120 days found..    Results  Labs   - White Blood Cell Count: 30 April 2025, 5.6   - Hemoglobin: 30 April 2025, 11.8   - MCV: 30 April 2025, 98   - Platelet Count: 30 April 2025, Normal   - Blood Glucose: 30 April 2025, 135   - BUN: 30 April 2025, Normal   - Creatinine: 30 April 2025, Normal   - Sodium: 30 April 2025, Normal   - Potassium: 30 April 2025, Normal   - Calcium: 30 April 2025, 9.7   - Bilirubin: 30 April 2025, Normal   - ALT: 30 April 2025, Normal   - AST: 30 April 2025, Normal   - Alkaline Phosphatase: 30 April 2025, Normal   - EGFR: 30 April 2025, 47   - Ferritin: 30 April 2025, 52   - Folic Acid: 30 April 2025, >20   - Iron: 30 April 2025, 126   - Reticulocyte Count: 30 April 2025, 1.58   - TIBC: 30 April 2025, 351   - Hemoglobin: November 2024, 12.2      Labs   - Iron: Normal   - B12: Normal   - Folate: Normal   - White Blood Cells: Normal   - Platelets: Normal   - Hemoglobin: 12.0 g/dL   - LDH: Normal   - Haptoglobin: Normal   - Urine Test: Normal   - H. pylori Test: Negative   - Thyroid Testing: Normal   - EGFR: 41.6    Imaging   - Colonoscopy: Internal and external hemorrhoids       Assessment and Plan:  Diagnoses and all orders for this visit:    1. Macrocytic anemia (Primary)  -     CBC & Differential  -     Comprehensive Metabolic Panel  -     Erythropoietin    2. Kappa light chain disease  Comments:  Elevated KAPPA with  normal ratio - recheck in 4-6 months  Orders:  -     CBC & Differential  -     Comprehensive Metabolic Panel    3. Stage 3b chronic kidney disease  -     CBC & Differential  -     Comprehensive Metabolic Panel  -     Erythropoietin    4. BRBPR (bright red blood per rectum)  Comments:  S/P Colon - having banding surgery for hemorrhoids      Assessment & Plan  Anemia  - Reports no symptoms such as shortness of breath, palpitations, dizziness, or lightheadedness  - Blood work from 30 April 2025 showed hemoglobin level of 11.8 g/dL (below normal range for males: 13-15 g/dL)  - MCV slightly high at 98 fL (indicating larger red blood cells)  - Reticulocyte count slightly elevated at 1.58% (suggesting active bone marrow response)  - Ferritin level at 52 ng/mL and iron level at 126 mcg/dL (both within normal ranges)  - Noticed small amount of bright red blood when wiping after bowel movements for the past couple of months (not reported to primary care provider)  - No significant weight loss, changes in bowel habits, or gastrointestinal symptoms (nausea, vomiting, diarrhea, or constipation)  - No tobacco or alcohol use  - Blood work will be ordered today to further evaluate anemia    Hematochezia  - Noticed small amount of bright red blood when wiping after bowel movements for the past couple of months (not reported to primary care provider or seen by a specialist)  - No significant weight loss, changes in bowel habits, or gastrointestinal symptoms (nausea, vomiting, diarrhea, or constipation)      Anemia  - Iron, B12, and folate levels are within normal range  - White blood cell count and platelet count are normal, suggesting no bone marrow destruction  - Hemoglobin level slightly low at 12.0 g/dL, indicating mild anemia  - LDH and haptoglobin levels do not suggest hemolysis  - Urine test normal, tested negative for H. pylori  - Presence of bright red blood could explain mild anemia  - Kidney function slightly impaired,  eGFR of 41.6, could be contributing to anemia  - Thyroid function normal  - Advised to increase fluid intake (water, flavored water, juice, coffee)  - Limit consumption of Coke Zero to a few cans per day  - Ice cream should be consumed in moderation  - Repeat CBC will be ordered to monitor for ongoing blood loss    Hemorrhoids  - Diagnosed with internal and external hemorrhoids, likely causing bleeding  - Follow-up with Dr. Etienne planned to discuss potential banding treatment  - If banding procedure performed and hemoglobin levels return to normal, it will confirm source of anemia  - If anemia persists post-banding, further investigation necessary    Chronic kidney disease  - eGFR of 41.6, indicating reduced kidney function  - Advised to increase fluid intake to improve kidney function and prevent further decline  - Educated on importance of staying hydrated to avoid acute renal failure    Parkinson's disease  - Diagnosed with Parkinson's disease, which can contribute to fatigue continue neuro  - Advised to maintain adequate hydration to help manage symptoms    -Encouraged patient to remain up to date on all recommended preventative medicine services specific for their sex and age.  Some examples include:  Diabetes screening, Hypertensive screening, Immunizations, Lipid screenings (cholesterol), Depression screenings, Colorectal cancer screening, HIV screening, Cervical cancer screening, Breast cancer screening, Osteoporosis screening, Alcohol screening, Dental screening, Tobacco Use screening and Dietary screening    Patient Follow Up: 8-12 weeks with MD RIVERO spent 30 minutes caring for Josef on this date of service. This time includes time spent by me in the following activities:preparing for the visit, reviewing tests, obtaining and/or reviewing a separately obtained history, performing a medically appropriate examination and/or evaluation , counseling and educating the patient/family/caregiver, ordering  medications, tests, or procedures, documenting information in the medical record, independently interpreting results and communicating that information with the patient/family/caregiver, and care coordination    Patient was given instructions and counseling regarding his condition or for health maintenance advice. Please see specific information pulled into the AVS if appropriate.     Patient or patient representative verbalized consent for the use of Ambient Listening during the visit with  Herbert Foss PA-C for chart documentation. 7/8/2025  10:16 EDT

## 2025-07-08 ENCOUNTER — OFFICE VISIT (OUTPATIENT)
Dept: ONCOLOGY | Facility: HOSPITAL | Age: 80
End: 2025-07-08
Payer: MEDICARE

## 2025-07-08 VITALS
RESPIRATION RATE: 16 BRPM | OXYGEN SATURATION: 93 % | WEIGHT: 168.8 LBS | HEIGHT: 69 IN | BODY MASS INDEX: 25 KG/M2 | HEART RATE: 65 BPM | DIASTOLIC BLOOD PRESSURE: 70 MMHG | SYSTOLIC BLOOD PRESSURE: 101 MMHG

## 2025-07-08 DIAGNOSIS — D89.89 KAPPA LIGHT CHAIN DISEASE: ICD-10-CM

## 2025-07-08 DIAGNOSIS — K62.5 BRBPR (BRIGHT RED BLOOD PER RECTUM): ICD-10-CM

## 2025-07-08 DIAGNOSIS — D53.9 MACROCYTIC ANEMIA: Primary | ICD-10-CM

## 2025-07-08 DIAGNOSIS — N18.32 STAGE 3B CHRONIC KIDNEY DISEASE: ICD-10-CM

## 2025-07-08 PROCEDURE — G0463 HOSPITAL OUTPT CLINIC VISIT: HCPCS | Performed by: PHYSICIAN ASSISTANT

## 2025-07-08 RX ORDER — CARBIDOPA/LEVODOPA 25MG-250MG
1 TABLET ORAL 3 TIMES DAILY
COMMUNITY

## 2025-07-14 ENCOUNTER — OFFICE VISIT (OUTPATIENT)
Dept: SURGERY | Facility: CLINIC | Age: 80
End: 2025-07-14
Payer: MEDICARE

## 2025-07-14 VITALS — BODY MASS INDEX: 24.59 KG/M2 | RESPIRATION RATE: 18 BRPM | WEIGHT: 166 LBS | HEIGHT: 69 IN

## 2025-07-14 DIAGNOSIS — K64.2 GRADE III HEMORRHOIDS: Primary | ICD-10-CM

## 2025-07-14 PROCEDURE — 1160F RVW MEDS BY RX/DR IN RCRD: CPT | Performed by: SURGERY

## 2025-07-14 PROCEDURE — 1159F MED LIST DOCD IN RCRD: CPT | Performed by: SURGERY

## 2025-07-14 PROCEDURE — 99213 OFFICE O/P EST LOW 20 MIN: CPT | Performed by: SURGERY

## 2025-07-14 RX ORDER — SODIUM CHLORIDE 0.9 % (FLUSH) 0.9 %
10 SYRINGE (ML) INJECTION AS NEEDED
Status: CANCELLED | OUTPATIENT
Start: 2025-07-14

## 2025-07-14 RX ORDER — SODIUM CHLORIDE 0.9 % (FLUSH) 0.9 %
10 SYRINGE (ML) INJECTION EVERY 12 HOURS SCHEDULED
Status: CANCELLED | OUTPATIENT
Start: 2025-07-14

## 2025-07-14 RX ORDER — ONDANSETRON 2 MG/ML
4 INJECTION INTRAMUSCULAR; INTRAVENOUS EVERY 6 HOURS PRN
Status: CANCELLED | OUTPATIENT
Start: 2025-07-14

## 2025-07-14 RX ORDER — SODIUM CHLORIDE 9 MG/ML
40 INJECTION, SOLUTION INTRAVENOUS AS NEEDED
Status: CANCELLED | OUTPATIENT
Start: 2025-07-14

## 2025-07-14 NOTE — H&P (VIEW-ONLY)
Inpatient History and Physical Surgical Orders    Preadmission Location:   Preadmission Time:  Facility:  Surgery Date:  Surgery Time:  Preadmission Test date:     Chief Complaint  Outpatient History and Physical / Surgical Orders    Primary Care Provider: Heidy Torrez APRN    Referring Provider: No ref. provider found    Subjective      Patient Name: Josef Avila : 1945    HPI  The patient is a 80-year-old gentleman who recently had a colonoscopy done for rectal bleeding complaints.  He has large internal and external hemorrhoids.    Past History:  Medical History: has a past medical history of Anemia, Colon polyp, GERD (gastroesophageal reflux disease), Hyperlipidemia, Hypertension, Left lower quadrant abdominal pain, and Parkinson's disease.   Surgical History: has a past surgical history that includes Fracture surgery; Eye surgery; Colonoscopy; and Colonoscopy (N/A, 2025).   Family History: family history includes Alcohol abuse in his brother, brother, and father; Arthritis in his mother; Cancer in his brother and brother; Depression in his brother; Diabetes in his brother and mother; Early death in his brother, brother, and father; Hearing loss in his mother; Heart disease in his father; Hypertension in his father and mother.   Social History: reports that he quit smoking about 15 years ago. His smoking use included cigarettes. He started smoking about 60 years ago. He has a 45 pack-year smoking history. He has never used smokeless tobacco. He reports that he does not currently use alcohol after a past usage of about 3.0 standard drinks of alcohol per week. He reports that he does not use drugs.  Allergies: Patient has no known allergies.       Current Outpatient Medications:     ALPRAZolam (XANAX) 0.25 MG tablet, Take 1 tablet by mouth every night at bedtime., Disp: , Rfl:     amLODIPine (NORVASC) 5 MG tablet, amlodipine 5 mg oral tablet take 1 tablet (5 mg) by oral route once daily    "Suspended, Disp: , Rfl:     aspirin 81 MG EC tablet, aspirin 81 mg oral tablet,delayed release (DR/EC) take 1 tablet (81 mg) by oral route once daily   Active, Disp: , Rfl:     atorvastatin (LIPITOR) 40 MG tablet, Take 1 tablet by mouth Daily., Disp: , Rfl:     carbidopa-levodopa (SINEMET)  MG per tablet, Take 1 tablet by mouth 3 (Three) Times a Day., Disp: , Rfl:     cholecalciferol (VITAMIN D3) 25 MCG (1000 UT) tablet, Take 5 tablets by mouth Daily., Disp: , Rfl:     Cobalamin Combinations (B-12) 100-5000 MCG sublingual tablet, Place  under the tongue Daily., Disp: , Rfl:     esomeprazole (NexIUM) 40 MG packet, Take 40 mg by mouth Daily., Disp: , Rfl:     FLUoxetine (PROzac) 40 MG capsule, Take 1 capsule by mouth Daily., Disp: , Rfl:     fluticasone (FLONASE) 50 MCG/ACT nasal spray, fluticasone 50 mcg/actuation nasal spray,suspension spray 1 spray (50 mcg) in each nostril by intranasal route once daily   Active, Disp: , Rfl:     lisinopril-hydrochlorothiazide (PRINZIDE,ZESTORETIC) 20-12.5 MG per tablet, Take 1 tablet by mouth 2 (Two) Times a Day., Disp: , Rfl:     primidone (MYSOLINE) 50 MG tablet, Take 1 tablet by mouth 3 (Three) Times a Day., Disp: , Rfl:     sertraline (ZOLOFT) 100 MG tablet, , Disp: , Rfl:     azelastine (ASTEPRO) 0.15 % solution nasal spray, azelastine 0.15 % (205.5 mcg) nasal spray,non-aerosol spray 1 spray (205.5 mcg) in each nostril by intranasal route 2 times per day   Suspended (Patient not taking: Reported on 7/14/2025), Disp: , Rfl:     cetirizine (zyrTEC) 10 MG tablet, Take 1 tablet by mouth Daily. (Patient not taking: Reported on 7/14/2025), Disp: , Rfl:     ferrous sulfate 325 (65 FE) MG tablet, , Disp: , Rfl:     PARoxetine (PAXIL) 20 MG tablet, Take 1 tablet by mouth Daily. (Patient not taking: Reported on 7/14/2025), Disp: , Rfl:        Objective   Vital Signs:   Resp 18   Ht 175.3 cm (69.02\")   Wt 75.3 kg (166 lb)   BMI 24.50 kg/m²       Physical Exam  Vitals and " nursing note reviewed.   Constitutional:       Appearance: Normal appearance. The patient is well-developed.   Cardiovascular:      Rate and Rhythm: Normal rate and regular rhythm.   Pulmonary:      Effort: Pulmonary effort is normal.      Breath sounds: Normal air entry.   Abdominal:      General: Bowel sounds are normal.      Palpations: Abdomen is soft.      Skin:     General: Skin is warm and dry.   Neurological:      Mental Status: The patient is alert and oriented to person, place, and time.      Motor: Motor function is intact.   Psychiatric:         Mood and Affect: Mood normal.       Result Review :               Assessment and Plan   Diagnoses and all orders for this visit:    1. Grade III hemorrhoids (Primary)  -     Case Request; Standing  -     Follow Anesthesia Guidelines / Protocol; Future  -     Follow Anesthesia Guidelines / Protocol; Standing  -     Verify NPO Status; Standing  -     Verify / Perform Chlorhexidine Skin Prep; Standing  -     Insert Peripheral IV; Standing  -     Saline Lock & Maintain IV Access; Standing  -     sodium chloride 0.9 % flush 10 mL  -     sodium chloride 0.9 % flush 10 mL  -     sodium chloride 0.9 % infusion 40 mL  -     Place Sequential Compression Device; Standing  -     Maintain Sequential Compression Device; Standing  -     ondansetron (ZOFRAN) injection 4 mg  -     ceFAZolin (ANCEF) 2,000 mg in sodium chloride 0.9 % 100 mL IVPB  -     Case Request    I have told the patient and his wife that I think we will need to proceed with a surgical hemorrhoidectomy given the size of his hemorrhoids.  I have described the procedure to him as well as the risk and benefits and he is agreeable to proceeding.    I  Calvin Etienne MD  07/14/2025

## 2025-07-16 NOTE — PRE-PROCEDURE INSTRUCTIONS
IMPORTANT INSTRUCTIONS - PRE-ADMISSION TESTING  DO NOT EAT OR CHEW anything after midnight the night before your procedure.    You may have CLEAR liquids up to _2__ hours prior to ARRIVAL time.   Take the following medications the morning of your procedure with JUST A SIP OF WATER:  _XANAX, AMLODIPINE, ASPIRIN, ATORVASTATIN, CARBIDOPA LEVODOPA, NEXIUM, PROZAC, PRIMIDONE, ZOLOFT _____________________________________________________________________________________________________________________________________________________________________________________    DO NOT BRING your medications to the hospital with you, UNLESS something has changed since your PRE-Admission Testing appointment.  Hold all vitamins, supplements, and NSAIDS (Non- steroidal anti-inflammatory meds) for one week prior to surgery (you MAY take Tylenol or Acetaminophen).  If you are diabetic, check your blood sugar the morning of your procedure. If it is less than 70 or if you are feeling symptomatic, call the following number for further instructions: [phone]  Use your inhalers/nebulizers as usual, the morning of your procedure. BRING YOUR INHALERS with you.   Bring your CPAP or BIPAP to hospital, ONLY IF YOU WILL BE SPENDING THE NIGHT.   Make sure you have a ride home and have someone who will stay with you the day of your procedure after you go home.  If you have any questions, please call your Pre-Admission Testing NurseLEENA at [phone]________.   Per anesthesia request, do not smoke for 24 hours before your procedure or as instructed by your surgeon.    Clear Liquid Diet        Find out when you need to start a clear liquid diet.   Think of “clear liquids” as anything you could read a newspaper through. This includes things like water, broth, sports drinks, or tea WITHOUT any kind of milk or cream.           Once you are told to start a clear liquid diet, only drink these things until 2 hours before arrival to the hospital  or when the hospital says to stop. Total volume limitation: 8 oz.       Clear liquids you CAN drink:   Water   Clear broth: beef, chicken, vegetable, or bone broth with nothing in it   Gatorade   Lemonade or Warren-aid   Soda   Tea, coffee (NO cream or honey)   Jell-O (without fruit)   Popsicles (without fruit or cream)   Italian ices   Juice without pulp: apple, white, grape   You may use salt, pepper, and sugar    Do NOT drink:   Milk or cream   Soy milk, almond milk, coconut milk, or other non-dairy drinks and   creamers   Milkshakes or smoothies   Tomato juice   Orange juice   Grapefruit juice   Cream soups or any other than broth         Clear Liquid Diet:  Do NOT eat any solid food.  Do NOT eat or suck on mints or candy.  Do NOT chew gum.  Do NOT drink thick liquids like milk or juice with pulp in it.  Do NOT add milk, cream, or anything like soy milk or almond milk to coffee or tea.

## 2025-07-17 ENCOUNTER — ANESTHESIA EVENT (OUTPATIENT)
Dept: PERIOP | Facility: HOSPITAL | Age: 80
End: 2025-07-17
Payer: MEDICARE

## 2025-07-17 ENCOUNTER — ANESTHESIA (OUTPATIENT)
Dept: PERIOP | Facility: HOSPITAL | Age: 80
End: 2025-07-17
Payer: MEDICARE

## 2025-07-17 ENCOUNTER — HOSPITAL ENCOUNTER (OUTPATIENT)
Facility: HOSPITAL | Age: 80
Setting detail: HOSPITAL OUTPATIENT SURGERY
Discharge: HOME | End: 2025-07-17
Attending: SURGERY | Admitting: SURGERY
Payer: MEDICARE

## 2025-07-17 VITALS
TEMPERATURE: 98.1 F | WEIGHT: 166.23 LBS | HEIGHT: 66 IN | HEART RATE: 56 BPM | SYSTOLIC BLOOD PRESSURE: 127 MMHG | RESPIRATION RATE: 15 BRPM | OXYGEN SATURATION: 95 % | BODY MASS INDEX: 26.71 KG/M2 | DIASTOLIC BLOOD PRESSURE: 81 MMHG

## 2025-07-17 DIAGNOSIS — K64.2 GRADE III HEMORRHOIDS: ICD-10-CM

## 2025-07-17 PROCEDURE — 25010000002 DEXAMETHASONE PER 1 MG

## 2025-07-17 PROCEDURE — 25010000002 METOCLOPRAMIDE PER 10 MG: Performed by: ANESTHESIOLOGY

## 2025-07-17 PROCEDURE — 25010000002 ONDANSETRON PER 1 MG

## 2025-07-17 PROCEDURE — 25010000002 HYDROMORPHONE 1 MG/ML SOLUTION

## 2025-07-17 PROCEDURE — 25010000002 LIDOCAINE PF 2% 2 % SOLUTION

## 2025-07-17 PROCEDURE — 25010000002 MIDAZOLAM PER 1MG: Performed by: ANESTHESIOLOGY

## 2025-07-17 PROCEDURE — 25010000002 BUPIVACAINE (PF) 0.5 % SOLUTION: Performed by: SURGERY

## 2025-07-17 PROCEDURE — 25010000002 CEFAZOLIN PER 500 MG: Performed by: SURGERY

## 2025-07-17 PROCEDURE — 88304 TISSUE EXAM BY PATHOLOGIST: CPT | Performed by: SURGERY

## 2025-07-17 PROCEDURE — 25010000002 PROPOFOL 200 MG/20ML EMULSION

## 2025-07-17 PROCEDURE — 25810000003 LACTATED RINGERS PER 1000 ML: Performed by: ANESTHESIOLOGY

## 2025-07-17 PROCEDURE — 46260 REMOVE IN/EX HEM GROUPS 2+: CPT | Performed by: SURGERY

## 2025-07-17 PROCEDURE — 25010000002 SUGAMMADEX 200 MG/2ML SOLUTION

## 2025-07-17 RX ORDER — PROPOFOL 10 MG/ML
INJECTION, EMULSION INTRAVENOUS AS NEEDED
Status: DISCONTINUED | OUTPATIENT
Start: 2025-07-17 | End: 2025-07-17 | Stop reason: SURG

## 2025-07-17 RX ORDER — IBUPROFEN 600 MG/1
600 TABLET, FILM COATED ORAL EVERY 6 HOURS PRN
Status: DISCONTINUED | OUTPATIENT
Start: 2025-07-17 | End: 2025-07-17 | Stop reason: HOSPADM

## 2025-07-17 RX ORDER — SODIUM CHLORIDE 0.9 % (FLUSH) 0.9 %
10 SYRINGE (ML) INJECTION EVERY 12 HOURS SCHEDULED
Status: DISCONTINUED | OUTPATIENT
Start: 2025-07-17 | End: 2025-07-17 | Stop reason: HOSPADM

## 2025-07-17 RX ORDER — EPHEDRINE SULFATE 50 MG/ML
INJECTION INTRAVENOUS AS NEEDED
Status: DISCONTINUED | OUTPATIENT
Start: 2025-07-17 | End: 2025-07-17 | Stop reason: SURG

## 2025-07-17 RX ORDER — LIDOCAINE HYDROCHLORIDE 20 MG/ML
INJECTION, SOLUTION EPIDURAL; INFILTRATION; INTRACAUDAL; PERINEURAL AS NEEDED
Status: DISCONTINUED | OUTPATIENT
Start: 2025-07-17 | End: 2025-07-17 | Stop reason: SURG

## 2025-07-17 RX ORDER — DEXAMETHASONE SODIUM PHOSPHATE 4 MG/ML
INJECTION, SOLUTION INTRA-ARTICULAR; INTRALESIONAL; INTRAMUSCULAR; INTRAVENOUS; SOFT TISSUE AS NEEDED
Status: DISCONTINUED | OUTPATIENT
Start: 2025-07-17 | End: 2025-07-17 | Stop reason: SURG

## 2025-07-17 RX ORDER — ONDANSETRON 2 MG/ML
4 INJECTION INTRAMUSCULAR; INTRAVENOUS EVERY 6 HOURS PRN
Status: DISCONTINUED | OUTPATIENT
Start: 2025-07-17 | End: 2025-07-17 | Stop reason: HOSPADM

## 2025-07-17 RX ORDER — ROCURONIUM BROMIDE 10 MG/ML
INJECTION, SOLUTION INTRAVENOUS AS NEEDED
Status: DISCONTINUED | OUTPATIENT
Start: 2025-07-17 | End: 2025-07-17 | Stop reason: SURG

## 2025-07-17 RX ORDER — ONDANSETRON 2 MG/ML
4 INJECTION INTRAMUSCULAR; INTRAVENOUS ONCE AS NEEDED
Status: DISCONTINUED | OUTPATIENT
Start: 2025-07-17 | End: 2025-07-17 | Stop reason: HOSPADM

## 2025-07-17 RX ORDER — ONDANSETRON 2 MG/ML
INJECTION INTRAMUSCULAR; INTRAVENOUS AS NEEDED
Status: DISCONTINUED | OUTPATIENT
Start: 2025-07-17 | End: 2025-07-17 | Stop reason: SURG

## 2025-07-17 RX ORDER — METOCLOPRAMIDE HYDROCHLORIDE 5 MG/ML
10 INJECTION INTRAMUSCULAR; INTRAVENOUS
Status: COMPLETED | OUTPATIENT
Start: 2025-07-17 | End: 2025-07-17

## 2025-07-17 RX ORDER — BUPIVACAINE HYDROCHLORIDE 5 MG/ML
INJECTION, SOLUTION EPIDURAL; INTRACAUDAL; PERINEURAL AS NEEDED
Status: DISCONTINUED | OUTPATIENT
Start: 2025-07-17 | End: 2025-07-17 | Stop reason: HOSPADM

## 2025-07-17 RX ORDER — SODIUM CHLORIDE 0.9 % (FLUSH) 0.9 %
10 SYRINGE (ML) INJECTION AS NEEDED
Status: DISCONTINUED | OUTPATIENT
Start: 2025-07-17 | End: 2025-07-17 | Stop reason: HOSPADM

## 2025-07-17 RX ORDER — SODIUM CHLORIDE 9 MG/ML
40 INJECTION, SOLUTION INTRAVENOUS AS NEEDED
Status: DISCONTINUED | OUTPATIENT
Start: 2025-07-17 | End: 2025-07-17 | Stop reason: HOSPADM

## 2025-07-17 RX ORDER — ONDANSETRON 4 MG/1
4 TABLET, ORALLY DISINTEGRATING ORAL ONCE AS NEEDED
Status: DISCONTINUED | OUTPATIENT
Start: 2025-07-17 | End: 2025-07-17 | Stop reason: HOSPADM

## 2025-07-17 RX ORDER — PROMETHAZINE HYDROCHLORIDE 25 MG/1
25 TABLET ORAL ONCE AS NEEDED
Status: DISCONTINUED | OUTPATIENT
Start: 2025-07-17 | End: 2025-07-17 | Stop reason: HOSPADM

## 2025-07-17 RX ORDER — MIDAZOLAM HYDROCHLORIDE 2 MG/2ML
2 INJECTION, SOLUTION INTRAMUSCULAR; INTRAVENOUS ONCE
Status: COMPLETED | OUTPATIENT
Start: 2025-07-17 | End: 2025-07-17

## 2025-07-17 RX ORDER — HYDROCODONE BITARTRATE AND ACETAMINOPHEN 5; 325 MG/1; MG/1
1 TABLET ORAL ONCE AS NEEDED
Status: DISCONTINUED | OUTPATIENT
Start: 2025-07-17 | End: 2025-07-17 | Stop reason: HOSPADM

## 2025-07-17 RX ORDER — PROMETHAZINE HYDROCHLORIDE 25 MG/1
25 SUPPOSITORY RECTAL ONCE AS NEEDED
Status: DISCONTINUED | OUTPATIENT
Start: 2025-07-17 | End: 2025-07-17 | Stop reason: HOSPADM

## 2025-07-17 RX ORDER — OXYCODONE HYDROCHLORIDE 5 MG/1
5 TABLET ORAL
Status: DISCONTINUED | OUTPATIENT
Start: 2025-07-17 | End: 2025-07-17 | Stop reason: HOSPADM

## 2025-07-17 RX ORDER — SODIUM CHLORIDE, SODIUM LACTATE, POTASSIUM CHLORIDE, CALCIUM CHLORIDE 600; 310; 30; 20 MG/100ML; MG/100ML; MG/100ML; MG/100ML
20 INJECTION, SOLUTION INTRAVENOUS CONTINUOUS PRN
Status: DISCONTINUED | OUTPATIENT
Start: 2025-07-17 | End: 2025-07-17 | Stop reason: HOSPADM

## 2025-07-17 RX ORDER — HYDROCODONE BITARTRATE AND ACETAMINOPHEN 5; 325 MG/1; MG/1
1 TABLET ORAL EVERY 6 HOURS PRN
Qty: 12 TABLET | Refills: 0 | Status: SHIPPED | OUTPATIENT
Start: 2025-07-17 | End: 2025-07-22 | Stop reason: SDUPTHER

## 2025-07-17 RX ORDER — ACETAMINOPHEN 500 MG
1000 TABLET ORAL ONCE
Status: COMPLETED | OUTPATIENT
Start: 2025-07-17 | End: 2025-07-17

## 2025-07-17 RX ADMIN — ONDANSETRON 4 MG: 2 INJECTION, SOLUTION INTRAMUSCULAR; INTRAVENOUS at 12:56

## 2025-07-17 RX ADMIN — LIDOCAINE HYDROCHLORIDE 100 MG: 20 INJECTION, SOLUTION EPIDURAL; INFILTRATION; INTRACAUDAL; PERINEURAL at 12:50

## 2025-07-17 RX ADMIN — METOCLOPRAMIDE 10 MG: 5 INJECTION, SOLUTION INTRAMUSCULAR; INTRAVENOUS at 12:38

## 2025-07-17 RX ADMIN — PROPOFOL 80 MG: 10 INJECTION, EMULSION INTRAVENOUS at 12:50

## 2025-07-17 RX ADMIN — ACETAMINOPHEN 1000 MG: 500 TABLET ORAL at 10:22

## 2025-07-17 RX ADMIN — HYDROMORPHONE HYDROCHLORIDE 0.5 MG: 1 INJECTION, SOLUTION INTRAMUSCULAR; INTRAVENOUS; SUBCUTANEOUS at 13:17

## 2025-07-17 RX ADMIN — MIDAZOLAM HYDROCHLORIDE 2 MG: 1 INJECTION, SOLUTION INTRAMUSCULAR; INTRAVENOUS at 12:38

## 2025-07-17 RX ADMIN — ROCURONIUM BROMIDE 50 MG: 10 INJECTION, SOLUTION INTRAVENOUS at 12:51

## 2025-07-17 RX ADMIN — SODIUM CHLORIDE 2000 MG: 9 INJECTION, SOLUTION INTRAVENOUS at 13:00

## 2025-07-17 RX ADMIN — EPHEDRINE SULFATE 10 MG: 50 INJECTION INTRAVENOUS at 12:50

## 2025-07-17 RX ADMIN — HYDROMORPHONE HYDROCHLORIDE 0.5 MG: 1 INJECTION, SOLUTION INTRAMUSCULAR; INTRAVENOUS; SUBCUTANEOUS at 12:50

## 2025-07-17 RX ADMIN — EPHEDRINE SULFATE 10 MG: 50 INJECTION INTRAVENOUS at 13:02

## 2025-07-17 RX ADMIN — SUGAMMADEX 200 MG: 100 INJECTION, SOLUTION INTRAVENOUS at 13:24

## 2025-07-17 RX ADMIN — SODIUM CHLORIDE, POTASSIUM CHLORIDE, SODIUM LACTATE AND CALCIUM CHLORIDE 20 ML/HR: 600; 310; 30; 20 INJECTION, SOLUTION INTRAVENOUS at 10:18

## 2025-07-17 RX ADMIN — DEXAMETHASONE SODIUM PHOSPHATE 4 MG: 4 INJECTION, SOLUTION INTRA-ARTICULAR; INTRALESIONAL; INTRAMUSCULAR; INTRAVENOUS; SOFT TISSUE at 12:56

## 2025-07-17 NOTE — OP NOTE
HEMORRHOIDECTOMY  Procedure Report    Patient Name:  Josef Avila  YOB: 1945    Date of Surgery:  7/17/2025     Indications: The patient is a 80-year-old gentleman who presented with symptomatic hemorrhoids.  The decision was made to proceed with a surgical hemorrhoidectomy.    Pre-op Diagnosis: Hemorrhoids    Post-Op Diagnosis: Same    Procedure/CPT® Codes:    2 quadrant surgical hemorrhoidectomy.    Staff:  Surgeon(s):  Calvin Etienne MD    Assistant: Peter Coles    Anesthesia: Monitored Anesthesia Care    Estimated Blood Loss: 10 mL    Implants:    Nothing was implanted during the procedure    Specimen:          Specimens       ID Source Type Tests Collected By Collected At Frozen?    A Hemorrhoid(s) Tissue TISSUE PATHOLOGY EXAM   Calvin Etienne MD 7/17/25 1311 No    Description: hemorrhoid    This specimen was not marked as sent.                Findings: Large internal and external hemorrhoids in 2 columns    Complications: None    Description of Procedure: The patient was taken the operating room and placed on the table in supine position.  After administration of MAC anesthesia, he was placed in lithotomy position and his perianal area was prepped and draped.  A digital rectal exam was performed and was noted to be normal.  We did an anoscopic exam and identified a very large hemorrhoids in the left lateral and right posterior lateral position with the right anterior lateral position being free of sizable hemorrhoids.  We made the decision to do a 2 quadrant surgical hemorrhoidectomy.  The left lateral hemorrhoid complex was grasped with a Allis clamp and the anoderm was incised with the scalpel.  We extended the mucosal incision around that large hemorrhoid complex up into the anal canal with the LigaSure.  After dissecting the hemorrhoid up off the sphincter the base of the hemorrhoid was divided with the LigaSure and that hemorrhoid was removed from the field.  That mucosal  defect was closed with a running 2-0 Vicryl suture.  We then repeated this process in the right posterior lateral position.  The hemorrhoid was grasped with an Allis clamp and the anoderm was incised with the scalpel.  The mucosal incision was extended up around that hemorrhoid complex up into the anal canal with the LigaSure.  After dissecting the hemorrhoid up off the sphincter we then divided the base of the hemorrhoid with the LigaSure and removed that hemorrhoid complex including internal and external component from the field.  That mucosal defect was closed with a running 2-0 Vicryl suture.  At this point we appear to have good hemostasis.  The hemorrhoidectomy sites were infiltrated with quarter percent Marcaine and sterile dressings were applied.  The patient was then taken the postanesthesia recovery room in stable condition.    Assistant: Peter Coles  was responsible for performing the following activities: Retraction, Suction, and Placing Dressing and their skilled assistance was necessary for the success of this case.    Calvin Etienne MD     Date: 7/17/2025  Time: 13:24 EDT

## 2025-07-17 NOTE — NURSING NOTE
Patient ambulated to BR but was unable to void. Bladder scan shows 200-210.  MD notified.  Per Dr. Etienne, give a little bit longer and if unable to void may need to go home with saeed catheter. Patient advised.  IV fluids administered and patient encouraged to drink more PO fluids.

## 2025-07-17 NOTE — ANESTHESIA PREPROCEDURE EVALUATION
Anesthesia Evaluation     Patient summary reviewed and Nursing notes reviewed   no history of anesthetic complications:   NPO Solid Status: > 8 hours  NPO Liquid Status: > 2 hours           Airway   Mallampati: II  TM distance: >3 FB  Neck ROM: full  No difficulty expected  Dental    (+) edentulous    Pulmonary - negative pulmonary ROS and normal exam    breath sounds clear to auscultation  Cardiovascular - normal exam  Exercise tolerance: good (4-7 METS)    Rhythm: regular  Rate: normal    (+) hypertension, hyperlipidemia      Neuro/Psych- negative ROS  (+) Parkinson's disease  GI/Hepatic/Renal/Endo    (+) GERD, GI bleeding lower , renal disease- CRI    Musculoskeletal (-) negative ROS    Abdominal    Substance History - negative use     OB/GYN negative ob/gyn ROS         Other - negative ROS       ROS/Med Hx Other: PAT Nursing Notes unavailable.               Latest Reference Range & Units 05/13/25 10:38   Sodium 136 - 145 mmol/L 138   Potassium 3.5 - 5.2 mmol/L 4.2   Chloride 98 - 107 mmol/L 104   CO2 22.0 - 29.0 mmol/L 24.4   Anion Gap 5.0 - 15.0 mmol/L 9.6   BUN 8 - 23 mg/dL 41 (H)   Creatinine 0.76 - 1.27 mg/dL 1.67 (H)   BUN/Creatinine Ratio 7.0 - 25.0  24.6   eGFR >60.0 mL/min/1.73 41.6 (L)   Glucose 65 - 99 mg/dL 124 (H)   Calcium 8.6 - 10.5 mg/dL 9.9   (H): Data is abnormally high  (L): Data is abnormally low         Anesthesia Plan    ASA 2     general     (Patient understands anesthesia not responsible for dental damage.)  intravenous induction     Anesthetic plan, risks, benefits, and alternatives have been provided, discussed and informed consent has been obtained with: patient.    Use of blood products discussed with patient .    Plan discussed with CRNA.        CODE STATUS:

## 2025-07-17 NOTE — NURSING NOTE
Patient has been up to bathroom x3 and cannot void.  Is taking some PO fluids but not a lot.  Bladder scan shows 157ml.  Spoke with Dr. Etienne and he recommends patient go home with saeed for tonight and instruct on removal of catheter in am.  Patient and wife advised and verbalizes understandig.

## 2025-07-17 NOTE — ANESTHESIA POSTPROCEDURE EVALUATION
Patient: Josef Avila    Procedure Summary       Date: 07/17/25 Room / Location: [facility] OR 05 / [facility] MAIN OR    Anesthesia Start: 1243 Anesthesia Stop: 1341    Procedure: Hemorrhoidectomy-remove hemorrhoid columns with surgical excision (Anus) Diagnosis:       Grade III hemorrhoids      (Grade III hemorrhoids [K64.2])    Surgeons: Calvin Etienne MD Provider: Domenic Pendleton MD    Anesthesia Type: general ASA Status: 2            Anesthesia Type: general    Vitals  Vitals Value Taken Time   /58 07/17/25 14:01   Temp 36.2 °C (97.2 °F) 07/17/25 13:35   Pulse 56 07/17/25 14:01   Resp 13 07/17/25 13:55   SpO2 93 % 07/17/25 14:01   Vitals shown include unfiled device data.        Post Anesthesia Care and Evaluation    Patient location during evaluation: bedside  Patient participation: complete - patient participated  Level of consciousness: awake  Pain score: 2  Pain management: adequate    Airway patency: patent  Anesthetic complications: No anesthetic complications  PONV Status: none  Cardiovascular status: acceptable and stable  Respiratory status: acceptable  Hydration status: acceptable

## 2025-07-22 ENCOUNTER — OFFICE VISIT (OUTPATIENT)
Dept: SURGERY | Facility: CLINIC | Age: 80
End: 2025-07-22
Payer: MEDICARE

## 2025-07-22 ENCOUNTER — TELEPHONE (OUTPATIENT)
Dept: UROLOGY | Age: 80
End: 2025-07-22
Payer: MEDICARE

## 2025-07-22 VITALS — BODY MASS INDEX: 26.68 KG/M2 | WEIGHT: 166 LBS | HEIGHT: 66 IN

## 2025-07-22 DIAGNOSIS — R33.9 URINARY RETENTION: Primary | ICD-10-CM

## 2025-07-22 DIAGNOSIS — Z98.890 S/P HEMORRHOIDECTOMY: ICD-10-CM

## 2025-07-22 DIAGNOSIS — Z87.19 S/P HEMORRHOIDECTOMY: ICD-10-CM

## 2025-07-22 LAB — URINE VOLUME: 562

## 2025-07-22 PROCEDURE — 1159F MED LIST DOCD IN RCRD: CPT | Performed by: NURSE PRACTITIONER

## 2025-07-22 PROCEDURE — 51702 INSERT TEMP BLADDER CATH: CPT | Performed by: NURSE PRACTITIONER

## 2025-07-22 PROCEDURE — 51798 US URINE CAPACITY MEASURE: CPT | Performed by: NURSE PRACTITIONER

## 2025-07-22 PROCEDURE — 99024 POSTOP FOLLOW-UP VISIT: CPT | Performed by: NURSE PRACTITIONER

## 2025-07-22 PROCEDURE — 1160F RVW MEDS BY RX/DR IN RCRD: CPT | Performed by: NURSE PRACTITIONER

## 2025-07-22 RX ORDER — LIDOCAINE 50 MG/G
1 OINTMENT TOPICAL
Qty: 30 G | Refills: 0 | Status: SHIPPED | OUTPATIENT
Start: 2025-07-22

## 2025-07-22 RX ORDER — HYDROCODONE BITARTRATE AND ACETAMINOPHEN 5; 325 MG/1; MG/1
1 TABLET ORAL EVERY 4 HOURS PRN
Qty: 10 TABLET | Refills: 0 | Status: SHIPPED | OUTPATIENT
Start: 2025-07-22

## 2025-07-22 RX ORDER — TAMSULOSIN HYDROCHLORIDE 0.4 MG/1
1 CAPSULE ORAL DAILY
Qty: 90 CAPSULE | Refills: 0 | Status: SHIPPED | OUTPATIENT
Start: 2025-07-22 | End: 2025-10-20

## 2025-07-22 NOTE — TELEPHONE ENCOUNTER
PATIENT'S WIFE CALLED AND SAID HER  SAW APRIL TODAY.  SHE HAD SOMEONE PUT A CATHETER IN.  THE LEG BAG IS STRAPPED TO HIS LEG.  SHE DOESN'T KNOW WHAT TO DO.  IS SHE SUPPOSED TO TAKE THE SMALL BAG OFF AND PUT THE BIG BAG ON?  SHE DOESN'T KNOW WHAT SHE NEEDS TO DO.  SHE DOESN'T WANT IT TO GO UP IN HIS BLADDER.

## 2025-07-22 NOTE — TELEPHONE ENCOUNTER
Patient wife called me back and had already taken the bag leg off. I told her just to put the tubing from the big bag back into the same tube from the saeed. Pt V/U and then said she thought she accidentally cancel Josef appt with Mary. I let her kow that it was still on and she V/U,. She will call back with any other questions.

## 2025-07-22 NOTE — TELEPHONE ENCOUNTER
Hub staff attempted to follow warm transfer process and was unsuccessful     Caller: NINA GUY    Relationship to patient: Emergency Contact    Best call back number: [phone]    Patient is needing: APPT WAS ACCIDENTALLY CANCELLED AT TEXT REMINDER CALL. PT APPT WAS 07.29.2025 AT 9:45. CAN YOU GET THIS APPT BACK? PLEASE CALL TO ADVISE. THANK YOU

## 2025-07-22 NOTE — TELEPHONE ENCOUNTER
PATIENT'S WIFE, NINA, CALLED.  HER  HAD A HEMORRHOIDECTOMY ON 07/17/25.  HE IS IN A LOT OF PAIN.  HE HAS NOT HAD A BOWEL MOVEMENT SINCE SURGERY.  HE HAD A CATHETER THAT WAS REMOVED.  HE IS ONLY URINATING A DROP OR TWO.    I SPOKE TO THE PATIENT.  HE HAS NOT URINATED OTHER THAN A DROP OR TWO AT A TIME IN CLOSE TO 24 HOURS.    I CALLED THE PATIENT'S WIFE AND TOLD HER THAT APRIL WANTS HIM TO SEE HER FOR AN APPOINTMENT TODAY AT 10:15 EST.  SHE IS AGREEABLE, AND HE WILL COME.

## 2025-07-22 NOTE — PROGRESS NOTES
Patient in supine position.  Patient was draped and cleansed in normal sterile fashion.  After explanation of procedure, inserted 16 Slovenian coude with drain bag in place.  straw-yellow returned upon insertion.  Inflated balloon with 5-10 cc of sterile water.  Placed catheter securement device on left leg.  Patient tolerated well.  Educated patient on catheter care and provided extra supplies to patient.

## 2025-07-22 NOTE — PROGRESS NOTES
Chief Complaint: Post-op    Subjective      I am having trouble voiding and have not had a bowel movement       History of Present Illness  Josef Avila is a 80 y.o. male presents to [facility] for postop concern    Patient presents today with complaints of not being able to void and having issues with having a bowel movement.  Patient underwent a 2 quadrant hemorrhoidectomy on 7/17/2025 performed by Dr. Calvin Etienne.  Patient reports that he has been peeing teardrops at a time, no good urine stream.  Patient was having voiding issues prior to the surgery with nocturia and frequency and urgency.  Patient reports that he is in a lot of pain.  Denies any fever or chills.    Information    Grade III hemorrhoids   Final Diagnosis   Hemorrhoid, excision:               - Hemorrhoid      Electronically signed by Irene Riley MD     Objective     Past Medical History:   Diagnosis Date    Anemia     Colon polyp     GERD (gastroesophageal reflux disease)     Hyperlipidemia     Hypertension     Left lower quadrant abdominal pain     Parkinson's disease        Past Surgical History:   Procedure Laterality Date    COLONOSCOPY      COLONOSCOPY N/A 6/30/2025    Procedure: COLONOSCOPY;  Surgeon: Calvin Etienne MD;  Location: [facility] ENDOSCOPY;  Service: General;  Laterality: N/A;  HEMORRHOIDS    EYE SURGERY      Cataracts    FRACTURE SURGERY      HEMORRHOIDECTOMY N/A 7/17/2025    Procedure: Hemorrhoidectomy-remove hemorrhoid columns with surgical excision;  Surgeon: Calvin Etienne MD;  Location: [facility] MAIN OR;  Service: General;  Laterality: N/A;       No outpatient medications have been marked as taking for the 7/22/25 encounter (Office Visit) with Shai April, APRN.       No Known Allergies     Family History   Problem Relation Age of Onset    Arthritis Mother     Diabetes Mother     Hearing loss Mother     Hypertension Mother     Alcohol abuse Father     Early death Father      "    Fredis Ornelas    Heart disease Father     Hypertension Father     Alcohol abuse Brother     Cancer Brother         Passed away    Diabetes Brother     Early death Brother         Pancreatitis cancer    Alcohol abuse Brother     Cancer Brother         Passed away    Depression Brother     Early death Brother         Lung cancer       Social History     Socioeconomic History    Marital status:    Tobacco Use    Smoking status: Former     Current packs/day: 0.00     Average packs/day: 1 pack/day for 45.0 years (45.0 ttl pk-yrs)     Types: Cigarettes     Start date: 1/1/1965     Quit date: 2010     Years since quitting: 15.5    Smokeless tobacco: Never   Vaping Use    Vaping status: Never Used   Substance and Sexual Activity    Alcohol use: Not Currently     Alcohol/week: 3.0 standard drinks of alcohol     Types: 3 Cans of beer per week    Drug use: Never    Sexual activity: Not Currently     Partners: Female     Birth control/protection: Abstinence, None       Review of Systems   Constitutional:  Negative for chills and fever.   Gastrointestinal:  Positive for rectal pain. Negative for abdominal distention, abdominal pain, anal bleeding, blood in stool, constipation and diarrhea.        Vital Signs:   Ht 167.6 cm (66\")   Wt 75.3 kg (166 lb)   BMI 26.79 kg/m²      Physical Exam  Vitals and nursing note reviewed.   Constitutional:       General: He is not in acute distress.     Appearance: Normal appearance. He is not ill-appearing.   HENT:      Head: Normocephalic and atraumatic.   Cardiovascular:      Rate and Rhythm: Normal rate.   Pulmonary:      Effort: Pulmonary effort is normal.      Breath sounds: No stridor.   Abdominal:      Palpations: Abdomen is soft.   Genitourinary:     Comments: Bladder Scan interpretation     Estimation of residual urine via BVI 3000 Verathon Bladder Scan  MA/nurse performing: Merline GUERRERO  Residual Urine: 650 ml  Indication: Urinary retention   S/P hemorrhoidectomy "   Position: Supine  Examination: Incremental scanning of the suprapubic area using 2.0 MHz transducer using copious amounts of acoustic gel.   Findings: An anechoic area was demonstrated which represented the bladder, with measurement of residual urine as noted. I inspected this myself. In that the residual urine was stable or insignificant, refer to plan for treatment and plan necessary at this time.          Musculoskeletal:         General: Normal range of motion.   Skin:     General: Skin is warm and dry.      Coloration: Skin is not jaundiced.      Comments: Rectum - moderate amount of bruising noted around his anus.    Neurological:      General: No focal deficit present.      Mental Status: He is alert and oriented to person, place, and time.   Psychiatric:         Mood and Affect: Mood normal.         Behavior: Behavior normal.          Result Review :          []  Laboratory  []  Radiology  []  Pathology  []  Microbiology  []  EKG/Telemetry   []  Cardiology/Vascular   []  Old records  Today I reviewed Dr. Etienne's operative pathology report.     Assessment and Plan    Diagnoses and all orders for this visit:    1. Urinary retention (Primary)  -     Ambulatory Referral to Urology  -     [place] Catheter Insertion / Removal / Change    2. S/P hemorrhoidectomy  -     Bladder Scan  -     HYDROcodone-acetaminophen (NORCO) 5-325 MG per tablet; Take 1 tablet by mouth Every 4 (Four) Hours As Needed for Moderate Pain.  Dispense: 10 tablet; Refill: 0    Other orders  -     tamsulosin (FLOMAX) 0.4 MG capsule 24 hr capsule; Take 1 capsule by mouth Daily for 90 days.  Dispense: 90 capsule; Refill: 0  -     lidocaine (XYLOCAINE) 5 % ointment; Apply 1 Application topically to the appropriate area as directed Every 2 (Two) Hours As Needed for Mild Pain.  Dispense: 30 g; Refill: 0        Follow Up   Return for Keep f/u appt with Dr. Etienne; Keep scheduled appt with Urology.    Today I did prescribe the patient some  lidocaine ointment to help control pain.  I will also send in additional pain medication prescription.  I started the patient on Flomax.  I will have him follow-up with Antonietta in urology for voiding trial.    Seek medical emergency services:  Fever greater than 101 associated with chills.  Extreme pain.  Patient to call the office, 911, or go to the ER with new or worsening symptoms.      Patient was given instructions and counseling regarding his condition or for health maintenance advice. Please see specific information pulled into the AVS if appropriate.     As always, it has been a pleasure to participate in your patient's care. Please call with questions or concerns.

## 2025-07-24 ENCOUNTER — TELEPHONE (OUTPATIENT)
Dept: SURGERY | Facility: CLINIC | Age: 80
End: 2025-07-24
Payer: MEDICARE

## 2025-07-24 RX ORDER — LIDOCAINE 50 MG/G
1 OINTMENT TOPICAL
Qty: 50 G | Refills: 0 | Status: SHIPPED | OUTPATIENT
Start: 2025-07-24

## 2025-07-24 NOTE — TELEPHONE ENCOUNTER
CALLED PTS WIFE NINA TO INFORM HER THAT THE PA FOR THE LIDOCAINE CREAM WAS DENIED. APRIL WILL SEND THE PRESCRIPTION TO [facility] FOR A 50 GRAM TUBE FOR A CHEAPER PRICE. ALSO PTS WIFE SAID THAT SHE NOTICED LITTLE BLOOD CLOTS IN HIS URINE OUTPUT BAG AND APRIL ADVISED THAT THIS WAS NORMAL.

## 2025-07-28 NOTE — PROGRESS NOTES
Chief Complaint: Urinary Retention    Patient or patient representative verbalized consent for the use of Ambient Listening during the visit with  VAISHALI Garcia for chart documentation. 7/29/2025  10:06 EDT    Subjective           History of Present Illness  Josef Avila is a 80 y.o. male presents to [facility] to be seen for urinary retention.    The patient reports that a catheter was inserted following his hemorrhoidectomy on 07/17/2025, performed by Dr. Etienne, due to urinary retention. The catheter was removed the next day, but he remained unable to urinate and had to have it reinserted. Prior to his surgery, he reported feeling the need to urinate frequently and urgently, waking up two to three times per night to urinate, and experiencing incontinence. He has no history of kidney stones or surgeries on his kidneys, bladder, or prostate.  He was not on any medications for an enlarged prostate before his hemorrhoid surgery, but was started on Flomax on 7/22/2025. He has been experiencing weakness, loss of appetite, and lightheadedness for some time, but feels like the lightheadedness could be slightly worse since starting the Flomax.  He is taking the Flomax in the evenings.    He experiences pain in the perineal area, which has been present for several weeks. He is unsure if there is blood in his urine. He reports no changes in his urine stream or straining to urinate.     His PSA was checked in 11/2024 and was normal. He has a family history of cancer, though it is unclear if it was prostate or pancreatic cancer.     He has a history of high blood pressure, Parkinson's disease, and Alzheimer's disease. He is not diabetic but has been informed that his kidneys are at risk due to inadequate fluid intake. He is not on any blood thinners except for baby aspirin. He does not use tobacco. He is currently on Flomax, which he takes at night, and is concerned that this medication may  be causing some of his his weakness and lightheadedness.     He also reports difficulty swallowing and has discussed this with his primary care physician.    PAST SURGICAL HISTORY:  Hemorrhoidectomy on 07/17/2025    SOCIAL HISTORY  He does not use any tobacco.    FAMILY HISTORY  His brother had cancer, but the specific type (prostate or pancreas) is uncertain. No family history of bladder or kidney cancer.    Urinary symptoms/history:   Frequency-admits     Urgency-admits     Incontinence-denies     Nocturia-admits, 2-3 X per night     Dysuria-denies     Perineal pain-admits     Stream-normal     GH-denies     History of stones-denies      surgeries-denies     Family history of  malignancy-unsure about prostate cancer, no known history of bladder or kidney cancer     Cardiopulmonary-HTN, Parkinson's disease, Alzheimer's disease    Anticoagulants-ASA 81mg     Smoker-denies     PSA  11/12/2024 1.36  9/21/2023 0.98    Objective     Past Medical History:   Diagnosis Date    Anemia     Colon polyp     GERD (gastroesophageal reflux disease)     Hyperlipidemia     Hypertension     Left lower quadrant abdominal pain     Parkinson's disease     Rectal bleeding        Past Surgical History:   Procedure Laterality Date    COLONOSCOPY      COLONOSCOPY N/A 06/30/2025    Procedure: COLONOSCOPY;  Surgeon: Calvin Etienne MD;  Location: [facility] ENDOSCOPY;  Service: General;  Laterality: N/A;  HEMORRHOIDS    EYE SURGERY      Cataracts    FRACTURE SURGERY      HEMORRHOIDECTOMY N/A 07/17/2025    Procedure: Hemorrhoidectomy-remove hemorrhoid columns with surgical excision;  Surgeon: Calvin Etienne MD;  Location: [facility] MAIN OR;  Service: General;  Laterality: N/A;    HERNIA REPAIR           Current Outpatient Medications:     ALPRAZolam (XANAX) 0.25 MG tablet, Take 1 tablet by mouth Daily As Needed for Anxiety., Disp: , Rfl:     aspirin 81 MG EC tablet, aspirin 81 mg oral tablet,delayed release (DR/EC) take 1 tablet (81 mg) by  oral route once daily   Active, Disp: , Rfl:     atorvastatin (LIPITOR) 40 MG tablet, Take 1 tablet by mouth Daily., Disp: , Rfl:     carbidopa-levodopa (SINEMET)  MG per tablet, Take 1 tablet by mouth 3 (Three) Times a Day., Disp: , Rfl:     cholecalciferol (VITAMIN D3) 25 MCG (1000 UT) tablet, Take 5 tablets by mouth Daily., Disp: , Rfl:     Cobalamin Combinations (B-12) 100-5000 MCG sublingual tablet, Place  under the tongue Daily., Disp: , Rfl:     esomeprazole (NexIUM) 40 MG packet, Take 40 mg by mouth Daily., Disp: , Rfl:     FLUoxetine (PROzac) 40 MG capsule, Take 1 capsule by mouth Daily., Disp: , Rfl:     fluticasone (FLONASE) 50 MCG/ACT nasal spray, fluticasone 50 mcg/actuation nasal spray,suspension spray 1 spray (50 mcg) in each nostril by intranasal route once daily   Active, Disp: , Rfl:     HYDROcodone-acetaminophen (NORCO) 5-325 MG per tablet, Take 1 tablet by mouth Every 4 (Four) Hours As Needed for Moderate Pain., Disp: 10 tablet, Rfl: 0    lisinopril-hydrochlorothiazide (PRINZIDE,ZESTORETIC) 20-12.5 MG per tablet, Take 1 tablet by mouth 2 (Two) Times a Day., Disp: , Rfl:     tamsulosin (FLOMAX) 0.4 MG capsule 24 hr capsule, Take 1 capsule by mouth Daily for 90 days., Disp: 90 capsule, Rfl: 0    doxycycline (VIBRAMYCIN) 100 MG capsule, Take 1 capsule by mouth 2 (Two) Times a Day for 28 days., Disp: 56 capsule, Rfl: 0    Lactobacillus (Florajen Acidophilus) capsule, Take 1 capsule by mouth Daily., Disp: 28 capsule, Rfl: 0    No Known Allergies     Family History   Problem Relation Age of Onset    Arthritis Mother     Diabetes Mother     Hearing loss Mother     Hypertension Mother     Alcohol abuse Father     Early death Father         Lue Garretts    Heart disease Father     Hypertension Father     Alcohol abuse Brother     Cancer Brother         Passed away    Diabetes Brother     Early death Brother         Pancreatitis cancer    Alcohol abuse Brother     Cancer Brother         Passed away  "   Depression Brother     Early death Brother         Lung cancer    Cancer Maternal Uncle         Brain       Social History     Socioeconomic History    Marital status:    Tobacco Use    Smoking status: Former     Current packs/day: 0.00     Average packs/day: 1 pack/day for 45.0 years (45.0 ttl pk-yrs)     Types: Cigarettes     Start date: 1/1/1965     Quit date: 2010     Years since quitting: 15.5    Smokeless tobacco: Never   Vaping Use    Vaping status: Never Used   Substance and Sexual Activity    Alcohol use: Not Currently     Alcohol/week: 3.0 standard drinks of alcohol     Types: 3 Cans of beer per week    Drug use: Never    Sexual activity: Not Currently     Partners: Female     Birth control/protection: Abstinence, None       Vital Signs:   Resp 18   Ht 167.6 cm (66\")   Wt 77.1 kg (170 lb)   BMI 27.44 kg/m²      Physical Exam  Vitals and nursing note reviewed.   Constitutional:       General: He is not in acute distress.     Appearance: Normal appearance. He is not toxic-appearing.   Pulmonary:      Effort: Pulmonary effort is normal. No respiratory distress.   Neurological:      General: No focal deficit present.      Mental Status: He is alert and oriented to person, place, and time.          Result Review :   The following data was reviewed by: VAISHALI Garcia on 07/29/2025:     PSA          11/12/2024    22:12   PSA   PSA 1.36          Details          This result is from an external source.               Results  Labs   - PSA: 11/2024, Normal        Procedures        Assessment and Plan    Diagnoses and all orders for this visit:    1. Indwelling urinary catheter present (Primary)    2. Benign prostatic hyperplasia with lower urinary tract symptoms, symptom details unspecified    3. Perineal pain    4. Prostatitis, unspecified prostatitis type  -     doxycycline (VIBRAMYCIN) 100 MG capsule; Take 1 capsule by mouth 2 (Two) Times a Day for 28 days.  Dispense: 56 capsule; Refill: 0  -     " Lactobacillus (Florajen Acidophilus) capsule; Take 1 capsule by mouth Daily.  Dispense: 28 capsule; Refill: 0    5. Prostate cancer screening  -     PSA Screen; Future        Assessment & Plan  1. Urinary retention.  Symptoms suggest an enlarged prostate, which is common in men of his age. PSA levels were normal in 11/2024, indicating a low risk of prostate cancer. The possibility of a prostate infection was also considered. Adequate hydration is advised to ensure proper kidney function and urine production. The catheter will be removed today, and urination is expected within the next few hours. If urination does not occur, the catheter may need to be reinserted. A PSA test has been ordered for mid-11/2025. Flomax regimen will continue for now, but refills will not be provided until the next visit. Flomax should be taken before bedtime to minimize potential lightheadedness or dizziness. A round of antibiotics will be initiated to address the potential prostate infection. Vitamin intake should be  from the antibiotic regimen. A probiotic will also be started to replenish beneficial gut bacteria. If insurance does not cover the prescribed probiotic, over-the-counter alternatives such as Culturelle, Florastor, or Florajen can be used. If persistent dizziness occurs, discontinuation of Flomax and initiation of an alternative treatment may be considered.    2. Prostate pain.  Reports pain in the area where the prostate sits, which has been bothering him for weeks. This could be related to the recent procedure or a possible prostate infection. A round of antibiotics will be initiated to address the potential prostate infection. Vitamin intake should be  from the antibiotic regimen. A probiotic will also be started to replenish beneficial gut bacteria. If insurance does not cover the prescribed probiotic, over-the-counter alternatives such as Culturelle, Florastor, or Florajen can be used.    3.  Difficulty swallowing.  Reports trouble swallowing, which could contribute to weakness and dizziness due to inadequate food and fluid intake. Advised to contact the primary care provider to address this issue.    Follow-up  A follow-up visit is scheduled in 1 week.    PROCEDURE  The catheter was removed today.      Follow Up   Return in about 1 week (around 8/5/2025).  Patient was given instructions and counseling regarding his condition or for health maintenance advice. Please see specific information pulled into the AVS if appropriate.         This document has been electronically signed by VAISHALI Garcia  July 29, 2025 10:43 EDT

## 2025-07-29 ENCOUNTER — TELEPHONE (OUTPATIENT)
Dept: SURGERY | Facility: CLINIC | Age: 80
End: 2025-07-29

## 2025-07-29 ENCOUNTER — OFFICE VISIT (OUTPATIENT)
Dept: SURGERY | Facility: CLINIC | Age: 80
End: 2025-07-29
Payer: MEDICARE

## 2025-07-29 ENCOUNTER — OFFICE VISIT (OUTPATIENT)
Dept: UROLOGY | Age: 80
End: 2025-07-29
Payer: MEDICARE

## 2025-07-29 VITALS
WEIGHT: 170 LBS | DIASTOLIC BLOOD PRESSURE: 51 MMHG | TEMPERATURE: 98.1 F | HEART RATE: 68 BPM | HEIGHT: 66 IN | SYSTOLIC BLOOD PRESSURE: 80 MMHG | BODY MASS INDEX: 27.32 KG/M2

## 2025-07-29 VITALS — HEIGHT: 66 IN | BODY MASS INDEX: 27.32 KG/M2 | RESPIRATION RATE: 18 BRPM | WEIGHT: 170 LBS

## 2025-07-29 DIAGNOSIS — K64.2 GRADE III HEMORRHOIDS: Primary | ICD-10-CM

## 2025-07-29 DIAGNOSIS — N41.9 PROSTATITIS, UNSPECIFIED PROSTATITIS TYPE: ICD-10-CM

## 2025-07-29 DIAGNOSIS — R10.2 PERINEAL PAIN: ICD-10-CM

## 2025-07-29 DIAGNOSIS — N40.1 BENIGN PROSTATIC HYPERPLASIA WITH LOWER URINARY TRACT SYMPTOMS, SYMPTOM DETAILS UNSPECIFIED: ICD-10-CM

## 2025-07-29 DIAGNOSIS — Z96.0 INDWELLING URINARY CATHETER PRESENT: Primary | ICD-10-CM

## 2025-07-29 DIAGNOSIS — Z12.5 PROSTATE CANCER SCREENING: ICD-10-CM

## 2025-07-29 PROCEDURE — 99024 POSTOP FOLLOW-UP VISIT: CPT | Performed by: SURGERY

## 2025-07-29 RX ORDER — DOXYCYCLINE 100 MG/1
100 CAPSULE ORAL 2 TIMES DAILY
Qty: 56 CAPSULE | Refills: 0 | Status: SHIPPED | OUTPATIENT
Start: 2025-07-29 | End: 2025-08-26

## 2025-07-29 RX ORDER — LACTOBACILLUS ACIDOPHILUS 20B CELL
1 CAPSULE ORAL DAILY
Qty: 28 CAPSULE | Refills: 0 | Status: SHIPPED | OUTPATIENT
Start: 2025-07-29

## 2025-07-29 NOTE — PROGRESS NOTES
Chief Complaint  Post-op Follow-up    Subjective          Josef Avila presents to [facility]  History of Present Illness    Josef Avila is a 80 y.o. male  who presents today for a postoperative visit.     Patient is here for a follow-up after 2 quadrant hemorrhoidectomy for large hemorrhoids.  He is having some issues with constipation and discomfort.  His blood pressure was a little bit low today in the office with a systolic blood pressure of 80.  He has not been bleeding.  His heart rate is fine at 68.    Past History:  Medical History: has a past medical history of Anemia, Colon polyp, GERD (gastroesophageal reflux disease), Hyperlipidemia, Hypertension, Left lower quadrant abdominal pain, Parkinson's disease, and Rectal bleeding.   Surgical History: has a past surgical history that includes Fracture surgery; Eye surgery; Colonoscopy; Colonoscopy (N/A, 06/30/2025); Hemorrhoid surgery (N/A, 07/17/2025); and Hernia repair.   Family History: family history includes Alcohol abuse in his brother, brother, and father; Arthritis in his mother; Cancer in his brother, brother, and maternal uncle; Depression in his brother; Diabetes in his brother and mother; Early death in his brother, brother, and father; Hearing loss in his mother; Heart disease in his father; Hypertension in his father and mother.   Social History: reports that he quit smoking about 15 years ago. His smoking use included cigarettes. He started smoking about 60 years ago. He has a 45 pack-year smoking history. He has never used smokeless tobacco. He reports that he does not currently use alcohol after a past usage of about 3.0 standard drinks of alcohol per week. He reports that he does not use drugs.  Allergies: Patient has no known allergies.       Current Outpatient Medications:     ALPRAZolam (XANAX) 0.25 MG tablet, Take 1 tablet by mouth Daily As Needed for Anxiety., Disp: , Rfl:     aspirin 81 MG EC  "tablet, aspirin 81 mg oral tablet,delayed release (DR/EC) take 1 tablet (81 mg) by oral route once daily   Active, Disp: , Rfl:     atorvastatin (LIPITOR) 40 MG tablet, Take 1 tablet by mouth Daily., Disp: , Rfl:     carbidopa-levodopa (SINEMET)  MG per tablet, Take 1 tablet by mouth 3 (Three) Times a Day., Disp: , Rfl:     cholecalciferol (VITAMIN D3) 25 MCG (1000 UT) tablet, Take 5 tablets by mouth Daily., Disp: , Rfl:     Cobalamin Combinations (B-12) 100-5000 MCG sublingual tablet, Place  under the tongue Daily., Disp: , Rfl:     doxycycline (VIBRAMYCIN) 100 MG capsule, Take 1 capsule by mouth 2 (Two) Times a Day for 28 days., Disp: 56 capsule, Rfl: 0    esomeprazole (NexIUM) 40 MG packet, Take 40 mg by mouth Daily., Disp: , Rfl:     FLUoxetine (PROzac) 40 MG capsule, Take 1 capsule by mouth Daily., Disp: , Rfl:     fluticasone (FLONASE) 50 MCG/ACT nasal spray, fluticasone 50 mcg/actuation nasal spray,suspension spray 1 spray (50 mcg) in each nostril by intranasal route once daily   Active, Disp: , Rfl:     HYDROcodone-acetaminophen (NORCO) 5-325 MG per tablet, Take 1 tablet by mouth Every 4 (Four) Hours As Needed for Moderate Pain., Disp: 10 tablet, Rfl: 0    Lactobacillus (Florajen Acidophilus) capsule, Take 1 capsule by mouth Daily., Disp: 28 capsule, Rfl: 0    lisinopril-hydrochlorothiazide (PRINZIDE,ZESTORETIC) 20-12.5 MG per tablet, Take 1 tablet by mouth 2 (Two) Times a Day., Disp: , Rfl:     tamsulosin (FLOMAX) 0.4 MG capsule 24 hr capsule, Take 1 capsule by mouth Daily for 90 days., Disp: 90 capsule, Rfl: 0       Physical Exam  He does not appear ill.  His abdomen is soft.  We deferred rectal exam today.  Objective     Vital Signs:   BP (!) 80/51 (BP Location: Right arm, Patient Position: Sitting, Cuff Size: Adult)   Pulse 68   Temp 98.1 °F (36.7 °C)   Ht 167.6 cm (66\")   Wt 77.1 kg (170 lb)   BMI 27.44 kg/m²              Assessment and Plan    Diagnoses and all orders for this visit:    1. " Grade III hemorrhoids (Primary)    We will have him use some MiraLAX every couple of days to see if we can get him having bowel movements.  Will also contact his primary care doctor to see if they want to hold one of his antihypertensives for a few days.  I will see him back in 2 weeks.

## 2025-07-29 NOTE — TELEPHONE ENCOUNTER
Called the patients PCP and told them of the patients hypotension.  Dr Etienne wanted them aware of what it was running and thought that they might want to monitor his blood pressure medicines.  Spoke with Kathleen at the office.  She will pass the message on to the PCP.

## 2025-08-07 ENCOUNTER — OFFICE VISIT (OUTPATIENT)
Dept: UROLOGY | Age: 80
End: 2025-08-07
Payer: MEDICARE

## 2025-08-07 DIAGNOSIS — N41.9 PROSTATITIS, UNSPECIFIED PROSTATITIS TYPE: ICD-10-CM

## 2025-08-07 DIAGNOSIS — N40.1 BENIGN PROSTATIC HYPERPLASIA WITH LOWER URINARY TRACT SYMPTOMS, SYMPTOM DETAILS UNSPECIFIED: Primary | ICD-10-CM

## 2025-08-07 LAB — URINE VOLUME: 0

## 2025-08-07 RX ORDER — ONDANSETRON HYDROCHLORIDE 4 MG/5ML
4 SOLUTION ORAL ONCE
COMMUNITY

## 2025-08-07 RX ORDER — MIRTAZAPINE 7.5 MG/1
7.5 TABLET, FILM COATED ORAL DAILY PRN
COMMUNITY
Start: 2025-08-07 | End: 2025-09-07

## 2025-08-12 ENCOUNTER — OFFICE VISIT (OUTPATIENT)
Dept: SURGERY | Facility: CLINIC | Age: 80
End: 2025-08-12
Payer: MEDICARE

## 2025-08-12 VITALS — HEIGHT: 66 IN | BODY MASS INDEX: 27.32 KG/M2 | WEIGHT: 170 LBS | RESPIRATION RATE: 18 BRPM

## 2025-08-12 DIAGNOSIS — K64.2 GRADE III HEMORRHOIDS: Primary | ICD-10-CM

## 2025-08-12 PROCEDURE — 99024 POSTOP FOLLOW-UP VISIT: CPT | Performed by: SURGERY

## 2025-08-12 RX ORDER — DONEPEZIL HYDROCHLORIDE 10 MG/1
10 TABLET, FILM COATED ORAL
COMMUNITY
Start: 2025-08-11

## 2025-08-12 RX ORDER — ONDANSETRON 4 MG/1
TABLET, ORALLY DISINTEGRATING ORAL
COMMUNITY
Start: 2025-08-07

## (undated) DEVICE — INTENDED FOR TISSUE SEPARATION, AND OTHER PROCEDURES THAT REQUIRE A SHARP SURGICAL BLADE TO PUNCTURE OR CUT.: Brand: BARD-PARKER ® CARBON RIB-BACK BLADES

## (undated) DEVICE — SYR LL TP 10ML STRL

## (undated) DEVICE — SOLIDIFIER LIQLOC PLS 1500CC BT

## (undated) DEVICE — SUT VIC 2/0 SH 27IN

## (undated) DEVICE — DEFENDO AIR WATER SUCTION AND BIOPSY VALVE KIT FOR  OLYMPUS: Brand: DEFENDO AIR/WATER/SUCTION AND BIOPSY VALVE

## (undated) DEVICE — MINOR-LF: Brand: MEDLINE INDUSTRIES, INC.

## (undated) DEVICE — STERILE POLYISOPRENE POWDER-FREE SURGICAL GLOVES WITH EMOLLIENT COATING: Brand: PROTEXIS

## (undated) DEVICE — STRAP STIRUP SLP RNG 19X3.5IN DISP

## (undated) DEVICE — GOWN,SIRUS,POLYRNF,BRTHSLV,2XL,18/CS: Brand: MEDLINE

## (undated) DEVICE — Device

## (undated) DEVICE — SOL IRR NACL 0.9PCT BO 1000ML

## (undated) DEVICE — LINER SURG CANSTR SXN S/RIGD 1500CC

## (undated) DEVICE — SLV SCD KN/LEN ADJ EXPRSS BLENDED MD 1P/U

## (undated) DEVICE — SKIN PREP TRAY W/CHG: Brand: MEDLINE INDUSTRIES, INC.

## (undated) DEVICE — THE STERILE LIGHT HANDLE COVER IS USED WITH STERIS SURGICAL LIGHTING AND VISUALIZATION SYSTEMS.

## (undated) DEVICE — CONN JET HYDRA H20 AUXILIARY DISP

## (undated) DEVICE — SOL IRRG H2O PL/BG 1000ML STRL

## (undated) DEVICE — GAUZE,SPONGE,4"X4",16PLY,STRL,LF,10/TRAY: Brand: MEDLINE

## (undated) DEVICE — DEV OPN LIGASURE SM/JAW 28D 16.5MM 18.8CM 1P/U

## (undated) DEVICE — DRAPE,UNDERBUTTOCKS,PCH,STERILE: Brand: MEDLINE

## (undated) DEVICE — LEGGINGS, PAIR, CLEAR, STERILE: Brand: MEDLINE

## (undated) DEVICE — GLV SURG SENSICARE PI ORTHO SZ8 LF STRL

## (undated) DEVICE — PENCL SMOKE/EVAC MEGADYNE TELESCP 10FT